# Patient Record
Sex: FEMALE | Race: WHITE | NOT HISPANIC OR LATINO | Employment: OTHER
[De-identification: names, ages, dates, MRNs, and addresses within clinical notes are randomized per-mention and may not be internally consistent; named-entity substitution may affect disease eponyms.]

---

## 2018-03-21 ENCOUNTER — TRANSCRIBE ORDERS (OUTPATIENT)
Dept: SCHEDULING | Age: 68
End: 2018-03-21

## 2018-03-21 DIAGNOSIS — Z12.39 SCREENING BREAST EXAMINATION: Primary | ICD-10-CM

## 2018-03-26 ENCOUNTER — TRANSCRIBE ORDERS (OUTPATIENT)
Dept: SCHEDULING | Age: 68
End: 2018-03-26

## 2018-03-26 DIAGNOSIS — Z79.83 ENCOUNTER FOR ONGOING OSTEOPOROSIS BISPHOSPHONATE THERAPY: Primary | ICD-10-CM

## 2018-03-26 DIAGNOSIS — M81.0 ENCOUNTER FOR ONGOING OSTEOPOROSIS BISPHOSPHONATE THERAPY: Primary | ICD-10-CM

## 2018-03-26 DIAGNOSIS — M81.0 SENILE OSTEOPOROSIS: Primary | ICD-10-CM

## 2018-04-13 ENCOUNTER — HOSPITAL ENCOUNTER (OUTPATIENT)
Dept: RADIOLOGY | Facility: HOSPITAL | Age: 68
Discharge: HOME | End: 2018-04-13
Attending: INTERNAL MEDICINE
Payer: MEDICARE

## 2018-04-13 ENCOUNTER — HOSPITAL ENCOUNTER (OUTPATIENT)
Dept: RADIOLOGY | Facility: HOSPITAL | Age: 68
Discharge: HOME | End: 2018-04-13
Attending: OBSTETRICS & GYNECOLOGY
Payer: MEDICARE

## 2018-04-13 DIAGNOSIS — Z12.39 SCREENING BREAST EXAMINATION: ICD-10-CM

## 2018-04-13 DIAGNOSIS — M81.0 SENILE OSTEOPOROSIS: ICD-10-CM

## 2018-04-13 PROCEDURE — 77063 BREAST TOMOSYNTHESIS BI: CPT

## 2018-04-13 PROCEDURE — 77080 DXA BONE DENSITY AXIAL: CPT

## 2018-05-02 ENCOUNTER — TRANSCRIBE ORDERS (OUTPATIENT)
Dept: SCHEDULING | Age: 68
End: 2018-05-02

## 2018-05-03 ENCOUNTER — TRANSCRIBE ORDERS (OUTPATIENT)
Dept: SCHEDULING | Age: 68
End: 2018-05-03

## 2018-05-03 DIAGNOSIS — I73.9 INTERMITTENT CLAUDICATION (CMS/HCC): Primary | ICD-10-CM

## 2018-06-01 RX ORDER — TOPIRAMATE 200 MG/1
200 TABLET ORAL DAILY
COMMUNITY
Start: 2016-01-18 | End: 2018-06-24

## 2018-06-01 RX ORDER — LOSARTAN POTASSIUM 50 MG/1
50 TABLET ORAL DAILY
COMMUNITY
Start: 2017-11-28 | End: 2018-10-09

## 2018-06-02 PROBLEM — I10 ESSENTIAL HYPERTENSION: Status: ACTIVE | Noted: 2018-06-02

## 2018-06-02 PROBLEM — E78.5 DYSLIPIDEMIA: Status: ACTIVE | Noted: 2018-06-02

## 2018-06-02 PROBLEM — R00.2 PALPITATIONS: Status: ACTIVE | Noted: 2018-06-02

## 2018-06-13 ENCOUNTER — TELEPHONE (OUTPATIENT)
Dept: SCHEDULING | Facility: CLINIC | Age: 68
End: 2018-06-13

## 2018-06-13 NOTE — TELEPHONE ENCOUNTER
Kadie [facility] called asking for last ofc note,ekg, stress and ercho as well as cardiac cath report faxed to [phone]. Any questions call Kadie

## 2018-10-09 ENCOUNTER — OFFICE VISIT (OUTPATIENT)
Dept: CARDIOLOGY | Facility: CLINIC | Age: 68
End: 2018-10-09
Payer: MEDICARE

## 2018-10-09 VITALS
BODY MASS INDEX: 15.99 KG/M2 | HEART RATE: 62 BPM | SYSTOLIC BLOOD PRESSURE: 110 MMHG | RESPIRATION RATE: 16 BRPM | DIASTOLIC BLOOD PRESSURE: 80 MMHG | WEIGHT: 99.5 LBS | HEIGHT: 66 IN

## 2018-10-09 DIAGNOSIS — E78.5 DYSLIPIDEMIA: ICD-10-CM

## 2018-10-09 DIAGNOSIS — R00.2 PALPITATIONS: ICD-10-CM

## 2018-10-09 DIAGNOSIS — I10 ESSENTIAL HYPERTENSION: Primary | ICD-10-CM

## 2018-10-09 PROCEDURE — 99214 OFFICE O/P EST MOD 30 MIN: CPT | Performed by: INTERNAL MEDICINE

## 2018-10-09 PROCEDURE — 93000 ELECTROCARDIOGRAM COMPLETE: CPT | Performed by: INTERNAL MEDICINE

## 2018-10-09 RX ORDER — DICYCLOMINE HYDROCHLORIDE 10 MG/1
10 CAPSULE ORAL AS NEEDED
COMMUNITY
End: 2019-10-08

## 2018-10-09 RX ORDER — TOPIRAMATE 100 MG/1
100 CAPSULE, EXTENDED RELEASE ORAL NIGHTLY
COMMUNITY
Start: 2018-10-08

## 2018-10-09 RX ORDER — ALBUTEROL SULFATE 90 UG/1
2 AEROSOL, METERED RESPIRATORY (INHALATION) AS NEEDED
Refills: 0 | COMMUNITY
Start: 2018-07-10 | End: 2018-10-09

## 2018-10-09 RX ORDER — DIAZEPAM 5 MG/1
5 TABLET ORAL
Refills: 2 | COMMUNITY
Start: 2018-08-23 | End: 2019-10-08

## 2018-10-09 RX ORDER — ALBUTEROL SULFATE 0.83 MG/ML
1 SOLUTION RESPIRATORY (INHALATION) AS NEEDED
Refills: 5 | COMMUNITY
Start: 2018-07-28 | End: 2018-10-09

## 2018-10-09 RX ORDER — ERGOCALCIFEROL 1.25 MG/1
1 CAPSULE ORAL
COMMUNITY
Start: 2017-02-23 | End: 2024-04-17 | Stop reason: ENTERED-IN-ERROR

## 2018-10-09 RX ORDER — LOSARTAN POTASSIUM 50 MG/1
50 TABLET ORAL DAILY
Qty: 90 TABLET | Refills: 3 | Status: SHIPPED | OUTPATIENT
Start: 2018-10-09 | End: 2019-10-08

## 2018-10-09 RX ORDER — OMEPRAZOLE 20 MG/1
1 CAPSULE, DELAYED RELEASE ORAL AS NEEDED
Refills: 11 | COMMUNITY
Start: 2018-08-13 | End: 2019-10-08

## 2018-10-09 ASSESSMENT — ENCOUNTER SYMPTOMS
COUGH: 0
LIGHT-HEADEDNESS: 0
DYSPNEA ON EXERTION: 0
MUSCLE CRAMPS: 0
WEIGHT LOSS: 0
SHORTNESS OF BREATH: 0
ALTERED MENTAL STATUS: 0
NECK PAIN: 1
ADENOPATHY: 0
DIAPHORESIS: 0
SYNCOPE: 0
ORTHOPNEA: 0
HEARTBURN: 0
COLOR CHANGE: 0
WEIGHT GAIN: 0
PALPITATIONS: 0
BLURRED VISION: 0
CLAUDICATION: 0

## 2018-10-09 NOTE — PROGRESS NOTES
Cardiology Note       Reason for visit:   Chief Complaint   Patient presents with   • Hypertension      HPI   Corrina Conteh is a 67 y.o. female who presents for scheduled cardiovascular follow-up in the office.    I last saw her a year ago.  I treat her for hypertension.  She updated me on her health history.  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  She believes she is cancer free and did not require radiation or chemotherapy.  She tells me she had pneumonia over the summer which slowed her down.  She now however feels well.  She is temporarily working.  She exercises by using the [employer] and doing yoga.  She is now eating a vegan diet.  She denies cardiac symptoms such as chest pain, shortness of breath, palpitations, lightheadedness or syncope.  She believes her blood pressure is under nice control and she tolerates losartan.      History reviewed. No pertinent past medical history.  History reviewed. No pertinent surgical history.  Social History     Social History   • Marital status:      Spouse name: N/A   • Number of children: N/A   • Years of education: N/A     Social History Main Topics   • Smoking status: Never Smoker   • Smokeless tobacco: Never Used   • Alcohol use No   • Drug use: No   • Sexual activity: Not Asked     Other Topics Concern   • None     Social History Narrative   • None     History reviewed. No pertinent family history.  Hydromorphone; Nsaids (non-steroidal anti-inflammatory drug); Penicillins; and Sulfa (sulfonamide antibiotics)  Current Outpatient Prescriptions   Medication Sig Dispense Refill   • diazePAM (VALIUM) 5 mg tablet Take 5 mg by mouth 2 (two) times a day.  2   • dicyclomine (BENTYL) 10 mg capsule Take 10 mg by mouth as needed.     • ergocalciferol (VITAMIN D2) 50,000 unit capsule Take 1 capsule by mouth every 30 (thirty) days.       • losartan (COZAAR) 50 mg tablet Take 1 tablet (50 mg total) by mouth daily. 90 tablet 3   • omeprazole  (PriLOSEC) 20 mg capsule Take 1 capsule by mouth as needed.    11   • ranitidine (ZANTAC) 300 mg tablet Take 300 mg by mouth as needed.       • topiramate (TOPAMAX) 200 mg tablet Take 1 tablet by mouth daily.       No current facility-administered medications for this visit.        Review of Systems   Constitution: Negative for diaphoresis, malaise/fatigue, weight gain and weight loss.   HENT: Negative for nosebleeds.    Eyes: Negative for blurred vision.   Cardiovascular: Negative for chest pain, claudication, dyspnea on exertion, leg swelling, orthopnea, palpitations and syncope.   Respiratory: Negative for cough and shortness of breath.    Hematologic/Lymphatic: Negative for adenopathy.   Skin: Negative for color change.   Musculoskeletal: Positive for neck pain. Negative for muscle cramps.   Gastrointestinal: Negative for heartburn.   Genitourinary: Negative for nocturia.   Neurological: Negative for light-headedness.   Psychiatric/Behavioral: Negative for altered mental status.     Objective   Vitals:    10/09/18 0832   BP: 110/80   Pulse: 62   Resp: 16       Physical Exam:    General Appearance:  Alert, no distress   Head:  Normocephalic, without obvious abnormality, atraumatic   Eyes:  Conjunctiva/corneas clear, EOM's intact   Neck: No thyroid enlargement. No JVD. No bruits    Lungs:   Clear to auscultation bilaterally, respirations unlabored, no rales, no wheezing   Heart:  Regular rhythm, S1 and S2 normal, no murmur, rub or gallop   Abdomen:   Soft, non-tender, no masses, no organomegaly   Vascular: Pulses 2+ and symmetric all extremities, no carotid bruit or jugular vein distention   Musculoskeletal:  Skin: No injury or deformity  Skin color, texture, turgor normal, no rashes or lesions, no cyanosis or edema   Extremities: Extremities normal, atraumatic, pulses normal   Behavior/Emotional: Appropriate, cooperative       Lab Results   Component Value Date    WBC 4.87 11/30/2017    HGB 12.4 11/30/2017    PLT  168 11/30/2017    CHOL 201 (H) 11/30/2017    TRIG 115 11/30/2017    HDL 61 11/30/2017    ALT 33 11/30/2017    AST 35 11/30/2017     11/30/2017    K 4.0 11/30/2017    CREATININE 0.7 11/30/2017    TSH 1.23 07/26/2016          ECG   sinus rhythm  Within normal limits     ASSESSMENT/PLAN    Problem List Items Addressed This Visit        High    Essential hypertension - Primary    Current Assessment & Plan     Her blood pressure is excellent on losartan.  She will continue to eat a heart healthy diet that is low in sodium.  She will continued exercise.         Relevant Medications    losartan (COZAAR) 50 mg tablet    Other Relevant Orders    ECG 12 lead (Completed)       Medium    Dyslipidemia    Overview     2009 cath: neg CAD  Calcium score: zero         Current Assessment & Plan     Her lipid profile from November 2017 was reviewed.  Her total cholesterol was 201, triglycerides 115, HDL 61.  She will continue on a heart healthy diet.  I do not believe she requires lipid altering therapy for primary prevention.            Low    Palpitations    Current Assessment & Plan     She has had no recent palpitations of concern.                Return in about 1 year (around 10/9/2019).    Sukh Sandra MD  10/9/2018

## 2018-10-09 NOTE — ASSESSMENT & PLAN NOTE
Her blood pressure is excellent on losartan.  She will continue to eat a heart healthy diet that is low in sodium.  She will continued exercise.

## 2018-10-09 NOTE — LETTER
October 9, 2018     Venus Aparicio MD  [address]  [address]  [place] [place] [facility] [address]    Patient: Corrina Conteh   YOB: 1950   Date of Visit: 10/9/2018       Dear Dr. Aparicio:    Thank you for referring Corrina Conteh to me for evaluation. Below are my notes for this consultation.    If you have questions, please do not hesitate to call me. I look forward to following your patient along with you.         Sincerely,        Sukh Sandra MD        CC: No Recipients  Sukh Sandra MD  10/9/2018  8:58 AM  Sign at close encounter     Cardiology Note       Reason for visit:   Chief Complaint   Patient presents with   • Hypertension      HPI   Corrina Conteh is a 67 y.o. female who presents for scheduled cardiovascular follow-up in the office.    I last saw her a year ago.  I treat her for hypertension.  She updated me on her health history.  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  She believes she is cancer free and did not require radiation or chemotherapy.  She tells me she had pneumonia over the summer which slowed her down.  She now however feels well.  She is temporarily working.  She exercises by using the [employer] and doing yoga.  She is now eating a vegan diet.  She denies cardiac symptoms such as chest pain, shortness of breath, palpitations, lightheadedness or syncope.  She believes her blood pressure is under nice control and she tolerates losartan.      History reviewed. No pertinent past medical history.  History reviewed. No pertinent surgical history.  Social History     Social History   • Marital status:      Spouse name: N/A   • Number of children: N/A   • Years of education: N/A     Social History Main Topics   • Smoking status: Never Smoker   • Smokeless tobacco: Never Used   • Alcohol use No   • Drug use: No   • Sexual activity: Not Asked     Other Topics Concern   • None     Social History Narrative   • None     History reviewed. No  pertinent family history.  Hydromorphone; Nsaids (non-steroidal anti-inflammatory drug); Penicillins; and Sulfa (sulfonamide antibiotics)  Current Outpatient Prescriptions   Medication Sig Dispense Refill   • diazePAM (VALIUM) 5 mg tablet Take 5 mg by mouth 2 (two) times a day.  2   • dicyclomine (BENTYL) 10 mg capsule Take 10 mg by mouth as needed.     • ergocalciferol (VITAMIN D2) 50,000 unit capsule Take 1 capsule by mouth every 30 (thirty) days.       • losartan (COZAAR) 50 mg tablet Take 1 tablet (50 mg total) by mouth daily. 90 tablet 3   • omeprazole (PriLOSEC) 20 mg capsule Take 1 capsule by mouth as needed.    11   • ranitidine (ZANTAC) 300 mg tablet Take 300 mg by mouth as needed.       • topiramate (TOPAMAX) 200 mg tablet Take 1 tablet by mouth daily.       No current facility-administered medications for this visit.        Review of Systems   Constitution: Negative for diaphoresis, malaise/fatigue, weight gain and weight loss.   HENT: Negative for nosebleeds.    Eyes: Negative for blurred vision.   Cardiovascular: Negative for chest pain, claudication, dyspnea on exertion, leg swelling, orthopnea, palpitations and syncope.   Respiratory: Negative for cough and shortness of breath.    Hematologic/Lymphatic: Negative for adenopathy.   Skin: Negative for color change.   Musculoskeletal: Positive for neck pain. Negative for muscle cramps.   Gastrointestinal: Negative for heartburn.   Genitourinary: Negative for nocturia.   Neurological: Negative for light-headedness.   Psychiatric/Behavioral: Negative for altered mental status.     Objective   Vitals:    10/09/18 0832   BP: 110/80   Pulse: 62   Resp: 16       Physical Exam:    General Appearance:  Alert, no distress   Head:  Normocephalic, without obvious abnormality, atraumatic   Eyes:  Conjunctiva/corneas clear, EOM's intact   Neck: No thyroid enlargement. No JVD. No bruits    Lungs:   Clear to auscultation bilaterally, respirations unlabored, no rales, no  wheezing   Heart:  Regular rhythm, S1 and S2 normal, no murmur, rub or gallop   Abdomen:   Soft, non-tender, no masses, no organomegaly   Vascular: Pulses 2+ and symmetric all extremities, no carotid bruit or jugular vein distention   Musculoskeletal:  Skin: No injury or deformity  Skin color, texture, turgor normal, no rashes or lesions, no cyanosis or edema   Extremities: Extremities normal, atraumatic, pulses normal   Behavior/Emotional: Appropriate, cooperative       Lab Results   Component Value Date    WBC 4.87 11/30/2017    HGB 12.4 11/30/2017     11/30/2017    CHOL 201 (H) 11/30/2017    TRIG 115 11/30/2017    HDL 61 11/30/2017    ALT 33 11/30/2017    AST 35 11/30/2017     11/30/2017    K 4.0 11/30/2017    CREATININE 0.7 11/30/2017    TSH 1.23 07/26/2016          ECG   sinus rhythm  Within normal limits     ASSESSMENT/PLAN    Problem List Items Addressed This Visit        High    Essential hypertension - Primary    Current Assessment & Plan     Her blood pressure is excellent on losartan.  She will continue to eat a heart healthy diet that is low in sodium.  She will continued exercise.         Relevant Medications    losartan (COZAAR) 50 mg tablet    Other Relevant Orders    ECG 12 lead (Completed)       Medium    Dyslipidemia    Overview     2009 cath: neg CAD  Calcium score: zero         Current Assessment & Plan     Her lipid profile from November 2017 was reviewed.  Her total cholesterol was 201, triglycerides 115, HDL 61.  She will continue on a heart healthy diet.  I do not believe she requires lipid altering therapy for primary prevention.            Low    Palpitations    Current Assessment & Plan     She has had no recent palpitations of concern.                Return in about 1 year (around 10/9/2019).    Sukh Sandra MD  10/9/2018

## 2018-10-09 NOTE — ASSESSMENT & PLAN NOTE
Her lipid profile from November 2017 was reviewed.  Her total cholesterol was 201, triglycerides 115, HDL 61.  She will continue on a heart healthy diet.  I do not believe she requires lipid altering therapy for primary prevention.

## 2019-04-03 ENCOUNTER — TRANSCRIBE ORDERS (OUTPATIENT)
Dept: SCHEDULING | Age: 69
End: 2019-04-03

## 2019-04-03 DIAGNOSIS — Z12.31 ENCOUNTER FOR SCREENING MAMMOGRAM FOR MALIGNANT NEOPLASM OF BREAST: ICD-10-CM

## 2019-04-03 DIAGNOSIS — M54.50 LOW BACK PAIN: Primary | ICD-10-CM

## 2019-04-04 ENCOUNTER — HOSPITAL ENCOUNTER (OUTPATIENT)
Dept: RADIOLOGY | Facility: HOSPITAL | Age: 69
Discharge: HOME | End: 2019-04-04
Attending: FAMILY MEDICINE
Payer: MEDICARE

## 2019-04-04 DIAGNOSIS — M54.50 LOW BACK PAIN: ICD-10-CM

## 2019-04-04 PROCEDURE — 72110 X-RAY EXAM L-2 SPINE 4/>VWS: CPT

## 2019-04-26 ENCOUNTER — HOSPITAL ENCOUNTER (OUTPATIENT)
Dept: RADIOLOGY | Facility: HOSPITAL | Age: 69
Discharge: HOME | End: 2019-04-26
Attending: FAMILY MEDICINE
Payer: MEDICARE

## 2019-04-26 DIAGNOSIS — Z12.31 ENCOUNTER FOR SCREENING MAMMOGRAM FOR MALIGNANT NEOPLASM OF BREAST: ICD-10-CM

## 2019-04-26 PROCEDURE — 77063 BREAST TOMOSYNTHESIS BI: CPT

## 2019-05-07 ENCOUNTER — APPOINTMENT (OUTPATIENT)
Dept: LAB | Facility: HOSPITAL | Age: 69
End: 2019-05-07
Attending: INTERNAL MEDICINE
Payer: MEDICARE

## 2019-05-07 ENCOUNTER — TRANSCRIBE ORDERS (OUTPATIENT)
Dept: LAB | Facility: HOSPITAL | Age: 69
End: 2019-05-07

## 2019-05-07 DIAGNOSIS — B02.9 ZOSTER WITHOUT COMPLICATIONS: ICD-10-CM

## 2019-05-07 DIAGNOSIS — C00.9: ICD-10-CM

## 2019-05-07 DIAGNOSIS — M54.50 LOW BACK PAIN: ICD-10-CM

## 2019-05-07 DIAGNOSIS — C00.9: Primary | ICD-10-CM

## 2019-05-07 DIAGNOSIS — E55.9 VITAMIN D DEFICIENCY: ICD-10-CM

## 2019-05-07 DIAGNOSIS — M25.512 PAIN IN LEFT SHOULDER: ICD-10-CM

## 2019-05-07 DIAGNOSIS — M81.0 AGE-RELATED OSTEOPOROSIS WITHOUT CURRENT PATHOLOGICAL FRACTURE: ICD-10-CM

## 2019-05-07 DIAGNOSIS — Z79.83 LONG TERM USE OF BISPHOSPHONATES: ICD-10-CM

## 2019-05-07 LAB
25(OH)D3 SERPL-MCNC: 30 NG/ML (ref 30–100)
ALBUMIN SERPL-MCNC: 4.1 G/DL (ref 3.4–5)
ALP SERPL-CCNC: 47 IU/L (ref 35–126)
ALT SERPL-CCNC: 35 IU/L (ref 11–54)
ANION GAP SERPL CALC-SCNC: 10 MEQ/L (ref 3–15)
AST SERPL-CCNC: 41 IU/L (ref 15–41)
BASOPHILS # BLD: 0.02 K/UL (ref 0.01–0.1)
BASOPHILS NFR BLD: 0.4 %
BILIRUB SERPL-MCNC: 0.5 MG/DL (ref 0.3–1.2)
BUN SERPL-MCNC: 23 MG/DL (ref 8–20)
CALCIUM SERPL-MCNC: 9.2 MG/DL (ref 8.9–10.3)
CHLORIDE SERPL-SCNC: 109 MEQ/L (ref 98–109)
CO2 SERPL-SCNC: 20 MEQ/L (ref 22–32)
CREAT SERPL-MCNC: 0.9 MG/DL
DIFFERENTIAL METHOD BLD: NORMAL
EOSINOPHIL # BLD: 0.09 K/UL (ref 0.04–0.36)
EOSINOPHIL NFR BLD: 1.8 %
ERYTHROCYTE [DISTWIDTH] IN BLOOD BY AUTOMATED COUNT: 13 % (ref 11.7–14.4)
GFR SERPL CREATININE-BSD FRML MDRD: >60 ML/MIN/1.73M*2
GLUCOSE SERPL-MCNC: 87 MG/DL (ref 70–99)
HCT VFR BLDCO AUTO: 41.5 %
HGB BLD-MCNC: 13.3 G/DL
IMM GRANULOCYTES # BLD AUTO: 0 K/UL (ref 0–0.08)
IMM GRANULOCYTES NFR BLD AUTO: 0 %
LYMPHOCYTES # BLD: 1.81 K/UL (ref 1.2–3.5)
LYMPHOCYTES NFR BLD: 36.9 %
MAGNESIUM SERPL-MCNC: 2.3 MG/DL (ref 1.8–2.5)
MCH RBC QN AUTO: 30 PG (ref 28–33.2)
MCHC RBC AUTO-ENTMCNC: 32 G/DL (ref 32.2–35.5)
MCV RBC AUTO: 93.7 FL (ref 83–98)
MONOCYTES # BLD: 0.55 K/UL (ref 0.28–0.8)
MONOCYTES NFR BLD: 11.2 %
NEUTROPHILS # BLD: 2.43 K/UL (ref 1.7–7)
NEUTS SEG NFR BLD: 49.7 %
NRBC BLD-RTO: 0 %
PDW BLD AUTO: 10.5 FL (ref 9.4–12.3)
PLATELET # BLD AUTO: 199 K/UL
POTASSIUM SERPL-SCNC: 4.2 MEQ/L (ref 3.6–5.1)
PROT SERPL-MCNC: 6.8 G/DL (ref 6–8.2)
RBC # BLD AUTO: 4.43 M/UL (ref 3.93–5.22)
SODIUM SERPL-SCNC: 139 MEQ/L (ref 136–144)
WBC # BLD AUTO: 4.9 K/UL

## 2019-05-07 PROCEDURE — 82306 VITAMIN D 25 HYDROXY: CPT

## 2019-05-07 PROCEDURE — 85025 COMPLETE CBC W/AUTO DIFF WBC: CPT

## 2019-05-07 PROCEDURE — 83735 ASSAY OF MAGNESIUM: CPT

## 2019-05-07 PROCEDURE — 80053 COMPREHEN METABOLIC PANEL: CPT

## 2019-05-07 PROCEDURE — 36415 COLL VENOUS BLD VENIPUNCTURE: CPT

## 2019-08-30 ENCOUNTER — TELEPHONE (OUTPATIENT)
Dept: CARDIOLOGY | Facility: CLINIC | Age: 69
End: 2019-08-30

## 2019-08-30 NOTE — TELEPHONE ENCOUNTER
Called pt and left vm for the pt to call back to her 09/16 appt rescheduled as TAS will be on vacation that week thanks

## 2019-10-08 ENCOUNTER — OFFICE VISIT (OUTPATIENT)
Dept: CARDIOLOGY | Facility: CLINIC | Age: 69
End: 2019-10-08
Payer: MEDICARE

## 2019-10-08 VITALS
DIASTOLIC BLOOD PRESSURE: 60 MMHG | BODY MASS INDEX: 16.23 KG/M2 | HEART RATE: 66 BPM | WEIGHT: 101 LBS | SYSTOLIC BLOOD PRESSURE: 120 MMHG | HEIGHT: 66 IN

## 2019-10-08 DIAGNOSIS — R00.2 PALPITATIONS: ICD-10-CM

## 2019-10-08 DIAGNOSIS — E78.5 DYSLIPIDEMIA: ICD-10-CM

## 2019-10-08 DIAGNOSIS — I10 ESSENTIAL HYPERTENSION: Primary | ICD-10-CM

## 2019-10-08 PROCEDURE — 93000 ELECTROCARDIOGRAM COMPLETE: CPT | Performed by: INTERNAL MEDICINE

## 2019-10-08 PROCEDURE — 99213 OFFICE O/P EST LOW 20 MIN: CPT | Performed by: INTERNAL MEDICINE

## 2019-10-08 RX ORDER — LOSARTAN POTASSIUM 50 MG/1
50 TABLET ORAL DAILY
Qty: 90 TABLET | Refills: 3 | Status: SHIPPED | OUTPATIENT
Start: 2019-10-08 | End: 2020-04-29 | Stop reason: CLARIF

## 2019-10-08 ASSESSMENT — ENCOUNTER SYMPTOMS
DIAPHORESIS: 0
COLOR CHANGE: 0
ALTERED MENTAL STATUS: 0
BLURRED VISION: 0
ADENOPATHY: 0
WEIGHT GAIN: 0
DYSPNEA ON EXERTION: 0
SYNCOPE: 0
ORTHOPNEA: 0
HEADACHES: 1
LIGHT-HEADEDNESS: 1
CLAUDICATION: 0
HEARTBURN: 0
WEIGHT LOSS: 0
PALPITATIONS: 0
SHORTNESS OF BREATH: 0
COUGH: 0
MUSCLE CRAMPS: 0

## 2019-10-08 NOTE — LETTER
October 8, 2019     Venus Aparicio MD  [address]  [address]  [place] [address]    Patient: Corrina Conteh  YOB: 1950  Date of Visit: 10/8/2019      Dear Dr. Aparicio:    Thank you for referring Corrina Conteh to me for evaluation. Below are my notes for this consultation.    If you have questions, please do not hesitate to call me. I look forward to following your patient along with you.         Sincerely,        Sukh Sandra MD        CC: No Recipients  Sukh Sandra MD  10/8/2019 12:27 PM  Sign at close encounter     Cardiology Note       Reason for visit:   Chief Complaint   Patient presents with   • Hypertension      HPI   Corrina Conteh is a 68 y.o. female who presents for cardiovascular follow-up in the office.    I last saw her one year ago.  She has not had any changes to her health over that time.  She is still treated for headaches.  She has occasional heartburn.  She has occasional lightheadedness but no presyncope or syncope.  She exercises by doing yoga, dance, walking and using her palatine.  She denies chest pain or dyspnea of concern.  Her weight has been stable.  She eats a heart healthy diet.      History reviewed. No pertinent past medical history.  History reviewed. No pertinent surgical history.  Social History     Social History   • Marital status:      Spouse name: N/A   • Number of children: N/A   • Years of education: N/A     Social History Main Topics   • Smoking status: Never Smoker   • Smokeless tobacco: Never Used   • Alcohol use No   • Drug use: No   • Sexual activity: Defer     Other Topics Concern   • None     Social History Narrative   • None     History reviewed. No pertinent family history.  Hydromorphone; Nsaids (non-steroidal anti-inflammatory drug); Penicillins; and Sulfa (sulfonamide antibiotics)  Current Outpatient Prescriptions   Medication Sig Dispense Refill   • ergocalciferol (VITAMIN D2) 50,000 unit capsule Take 1 capsule by mouth  "every 30 (thirty) days.       • losartan (COZAAR) 50 mg tablet Take 1 tablet (50 mg total) by mouth daily. 90 tablet 3   • topiramate 100 mg capsule,extended release 24hr Take 100 mg by mouth daily.         No current facility-administered medications for this visit.        Review of Systems   Constitution: Negative for diaphoresis, malaise/fatigue, weight gain and weight loss.   HENT: Negative for nosebleeds.    Eyes: Negative for blurred vision.   Cardiovascular: Negative for chest pain, claudication, dyspnea on exertion, leg swelling, orthopnea, palpitations and syncope.   Respiratory: Negative for cough and shortness of breath.    Hematologic/Lymphatic: Negative for adenopathy.   Skin: Negative for color change.   Musculoskeletal: Negative for muscle cramps.   Gastrointestinal: Negative for heartburn.   Genitourinary: Negative for nocturia.   Neurological: Positive for headaches and light-headedness.   Psychiatric/Behavioral: Negative for altered mental status.     Objective   Vitals:    10/08/19 0959   BP: 120/60   BP Location: Left upper arm   Patient Position: Sitting   Pulse: 66   Weight: 45.8 kg (101 lb)   Height: 1.676 m (5' 6\")     Weight: 45.8 kg (101 lb)     Physical Exam:    General Appearance:  Alert, no distress   Head:  Normocephalic, without obvious abnormality, atraumatic   Eyes:  Conjunctiva/corneas clear, EOM's intact   Neck: No thyroid enlargement. No JVD. No bruits    Lungs:   Clear to auscultation bilaterally, respirations unlabored, no rales, no wheezing   Heart:  Regular rhythm, S1 and S2 normal, no murmur, rub or gallop   Abdomen:   Soft, non-tender, no masses, no organomegaly   Vascular: Pulses 2+ and symmetric all extremities, no carotid bruit or jugular vein distention   Musculoskeletal:  Skin: No injury or deformity  Skin color, texture, turgor normal, no rashes or lesions, no cyanosis or edema   Extremities: Extremities normal, atraumatic, pulses normal   Behavior/Emotional: " Appropriate, cooperative       Lab Results   Component Value Date    WBC 4.90 05/07/2019    HGB 13.3 05/07/2019     05/07/2019    CHOL 201 (H) 11/30/2017    TRIG 115 11/30/2017    HDL 61 11/30/2017    LDLCALC 117 (H) 11/30/2017    ALT 35 05/07/2019    AST 41 05/07/2019     05/07/2019    K 4.2 05/07/2019    CREATININE 0.9 05/07/2019    TSH 1.23 07/26/2016          ECG   sinus rhythm  Within normal limits     ASSESSMENT/PLAN    Problem List Items Addressed This Visit        High    Essential hypertension - Primary    Current Assessment & Plan     Her blood pressure is borderline low on losartan.  I told her if she has episodes of lightheadedness to contact me as I would lower her dose to 25 mg daily.  She will try to stay well-hydrated.  For now she will continue on her current dose.         Relevant Medications    losartan (COZAAR) 50 mg tablet    Other Relevant Orders    ECG 12 LEAD-OFFICE PERFORMED (Completed)       Medium    Dyslipidemia    Overview     2009 cath: neg CAD  Calcium score: zero         Current Assessment & Plan     She continues to try to eat a heart healthy diet.  She maintains a healthy weight.  She has no symptoms at this time to suggest any angina.  She will continue exercise report symptoms of concern.         Relevant Orders    ECG 12 LEAD-OFFICE PERFORMED (Completed)       Low    Palpitations    Current Assessment & Plan     She has not had palpitations lately.  No further therapy evaluation is required at this time.         Relevant Orders    ECG 12 LEAD-OFFICE PERFORMED (Completed)           Return in about 1 year (around 10/8/2020).    Sukh Sandra MD  10/8/2019

## 2019-10-08 NOTE — ASSESSMENT & PLAN NOTE
Her blood pressure is borderline low on losartan.  I told her if she has episodes of lightheadedness to contact me as I would lower her dose to 25 mg daily.  She will try to stay well-hydrated.  For now she will continue on her current dose.

## 2019-10-08 NOTE — PROGRESS NOTES
Cardiology Note       Reason for visit:   Chief Complaint   Patient presents with   • Hypertension      HPI   Corrina Conteh is a 68 y.o. female who presents for cardiovascular follow-up in the office.    I last saw her one year ago.  She has not had any changes to her health over that time.  She is still treated for headaches.  She has occasional heartburn.  She has occasional lightheadedness but no presyncope or syncope.  She exercises by doing yoga, dance, walking and using her palatine.  She denies chest pain or dyspnea of concern.  Her weight has been stable.  She eats a heart healthy diet.      History reviewed. No pertinent past medical history.  History reviewed. No pertinent surgical history.  Social History     Social History   • Marital status:      Spouse name: N/A   • Number of children: N/A   • Years of education: N/A     Social History Main Topics   • Smoking status: Never Smoker   • Smokeless tobacco: Never Used   • Alcohol use No   • Drug use: No   • Sexual activity: Defer     Other Topics Concern   • None     Social History Narrative   • None     History reviewed. No pertinent family history.  Hydromorphone; Nsaids (non-steroidal anti-inflammatory drug); Penicillins; and Sulfa (sulfonamide antibiotics)  Current Outpatient Prescriptions   Medication Sig Dispense Refill   • ergocalciferol (VITAMIN D2) 50,000 unit capsule Take 1 capsule by mouth every 30 (thirty) days.       • losartan (COZAAR) 50 mg tablet Take 1 tablet (50 mg total) by mouth daily. 90 tablet 3   • topiramate 100 mg capsule,extended release 24hr Take 100 mg by mouth daily.         No current facility-administered medications for this visit.        Review of Systems   Constitution: Negative for diaphoresis, malaise/fatigue, weight gain and weight loss.   HENT: Negative for nosebleeds.    Eyes: Negative for blurred vision.   Cardiovascular: Negative for chest pain, claudication, dyspnea on exertion, leg swelling, orthopnea,  "palpitations and syncope.   Respiratory: Negative for cough and shortness of breath.    Hematologic/Lymphatic: Negative for adenopathy.   Skin: Negative for color change.   Musculoskeletal: Negative for muscle cramps.   Gastrointestinal: Negative for heartburn.   Genitourinary: Negative for nocturia.   Neurological: Positive for headaches and light-headedness.   Psychiatric/Behavioral: Negative for altered mental status.     Objective   Vitals:    10/08/19 0959   BP: 120/60   BP Location: Left upper arm   Patient Position: Sitting   Pulse: 66   Weight: 45.8 kg (101 lb)   Height: 1.676 m (5' 6\")     Weight: 45.8 kg (101 lb)     Physical Exam:    General Appearance:  Alert, no distress   Head:  Normocephalic, without obvious abnormality, atraumatic   Eyes:  Conjunctiva/corneas clear, EOM's intact   Neck: No thyroid enlargement. No JVD. No bruits    Lungs:   Clear to auscultation bilaterally, respirations unlabored, no rales, no wheezing   Heart:  Regular rhythm, S1 and S2 normal, no murmur, rub or gallop   Abdomen:   Soft, non-tender, no masses, no organomegaly   Vascular: Pulses 2+ and symmetric all extremities, no carotid bruit or jugular vein distention   Musculoskeletal:  Skin: No injury or deformity  Skin color, texture, turgor normal, no rashes or lesions, no cyanosis or edema   Extremities: Extremities normal, atraumatic, pulses normal   Behavior/Emotional: Appropriate, cooperative       Lab Results   Component Value Date    WBC 4.90 05/07/2019    HGB 13.3 05/07/2019     05/07/2019    CHOL 201 (H) 11/30/2017    TRIG 115 11/30/2017    HDL 61 11/30/2017    LDLCALC 117 (H) 11/30/2017    ALT 35 05/07/2019    AST 41 05/07/2019     05/07/2019    K 4.2 05/07/2019    CREATININE 0.9 05/07/2019    TSH 1.23 07/26/2016          ECG   sinus rhythm  Within normal limits     ASSESSMENT/PLAN    Problem List Items Addressed This Visit        High    Essential hypertension - Primary    Current Assessment & Plan     " Her blood pressure is borderline low on losartan.  I told her if she has episodes of lightheadedness to contact me as I would lower her dose to 25 mg daily.  She will try to stay well-hydrated.  For now she will continue on her current dose.         Relevant Medications    losartan (COZAAR) 50 mg tablet    Other Relevant Orders    ECG 12 LEAD-OFFICE PERFORMED (Completed)       Medium    Dyslipidemia    Overview     2009 cath: neg CAD  Calcium score: zero         Current Assessment & Plan     She continues to try to eat a heart healthy diet.  She maintains a healthy weight.  She has no symptoms at this time to suggest any angina.  She will continue exercise report symptoms of concern.         Relevant Orders    ECG 12 LEAD-OFFICE PERFORMED (Completed)       Low    Palpitations    Current Assessment & Plan     She has not had palpitations lately.  No further therapy evaluation is required at this time.         Relevant Orders    ECG 12 LEAD-OFFICE PERFORMED (Completed)           Return in about 1 year (around 10/8/2020).    Sukh Sandra MD  10/8/2019

## 2019-10-08 NOTE — ASSESSMENT & PLAN NOTE
She continues to try to eat a heart healthy diet.  She maintains a healthy weight.  She has no symptoms at this time to suggest any angina.  She will continue exercise report symptoms of concern.

## 2019-11-18 ENCOUNTER — LAB REQUISITION (OUTPATIENT)
Dept: LAB | Facility: HOSPITAL | Age: 69
End: 2019-11-18
Payer: MEDICARE

## 2019-11-18 DIAGNOSIS — K58.0 IRRITABLE BOWEL SYNDROME WITH DIARRHEA: ICD-10-CM

## 2019-11-18 PROCEDURE — G0328 FECAL BLOOD SCRN IMMUNOASSAY: HCPCS | Performed by: INTERNAL MEDICINE

## 2019-11-19 LAB — HEMOCCULT STL QL IA: NEGATIVE

## 2019-12-27 ENCOUNTER — TRANSCRIBE ORDERS (OUTPATIENT)
Dept: SCHEDULING | Age: 69
End: 2019-12-27

## 2019-12-27 DIAGNOSIS — N95.0 POSTMENOPAUSAL BLEEDING: ICD-10-CM

## 2019-12-27 DIAGNOSIS — N92.6 IRREGULAR MENSTRUATION, UNSPECIFIED: Primary | ICD-10-CM

## 2020-01-02 ENCOUNTER — HOSPITAL ENCOUNTER (OUTPATIENT)
Dept: RADIOLOGY | Facility: HOSPITAL | Age: 70
Discharge: HOME | End: 2020-01-02
Attending: OBSTETRICS & GYNECOLOGY
Payer: MEDICARE

## 2020-01-02 DIAGNOSIS — N95.0 POSTMENOPAUSAL BLEEDING: ICD-10-CM

## 2020-01-02 DIAGNOSIS — N92.6 IRREGULAR MENSTRUATION, UNSPECIFIED: ICD-10-CM

## 2020-01-02 PROCEDURE — 76830 TRANSVAGINAL US NON-OB: CPT

## 2020-04-29 RX ORDER — TELMISARTAN 40 MG/1
40 TABLET ORAL DAILY
Qty: 30 TABLET | Refills: 5 | Status: SHIPPED | OUTPATIENT
Start: 2020-04-29 | End: 2020-10-07

## 2020-04-29 RX ORDER — LOSARTAN POTASSIUM 50 MG/1
50 TABLET ORAL DAILY
Qty: 90 TABLET | Refills: 3 | Status: CANCELLED | OUTPATIENT
Start: 2020-04-29

## 2020-06-03 ENCOUNTER — TRANSCRIBE ORDERS (OUTPATIENT)
Dept: SCHEDULING | Age: 70
End: 2020-06-03

## 2020-06-03 DIAGNOSIS — Z79.83 LONG TERM (CURRENT) USE OF BISPHOSPHONATES: ICD-10-CM

## 2020-06-03 DIAGNOSIS — M54.50 LOW BACK PAIN: ICD-10-CM

## 2020-06-03 DIAGNOSIS — M25.511 PAIN IN RIGHT SHOULDER: ICD-10-CM

## 2020-06-03 DIAGNOSIS — M81.0 AGE-RELATED OSTEOPOROSIS WITHOUT CURRENT PATHOLOGICAL FRACTURE: ICD-10-CM

## 2020-06-03 DIAGNOSIS — G47.62 SLEEP RELATED LEG CRAMPS: ICD-10-CM

## 2020-06-03 DIAGNOSIS — M25.512 PAIN IN LEFT SHOULDER: ICD-10-CM

## 2020-06-03 DIAGNOSIS — C00.9 MALIGNANT NEOPLASM OF LIP, UNSPECIFIED: ICD-10-CM

## 2020-06-03 DIAGNOSIS — M76.31 ILIOTIBIAL BAND SYNDROME, RIGHT LEG: ICD-10-CM

## 2020-06-03 DIAGNOSIS — E55.9 VITAMIN D DEFICIENCY, UNSPECIFIED: Primary | ICD-10-CM

## 2020-06-03 DIAGNOSIS — M79.671 PAIN IN RIGHT FOOT: ICD-10-CM

## 2020-06-03 DIAGNOSIS — Z12.31 ENCOUNTER FOR SCREENING MAMMOGRAM FOR MALIGNANT NEOPLASM OF BREAST: Primary | ICD-10-CM

## 2020-06-25 ENCOUNTER — TRANSCRIBE ORDERS (OUTPATIENT)
Dept: SCHEDULING | Age: 70
End: 2020-06-25

## 2020-06-25 ENCOUNTER — HOSPITAL ENCOUNTER (OUTPATIENT)
Dept: RADIOLOGY | Facility: HOSPITAL | Age: 70
Discharge: HOME | End: 2020-06-25
Attending: INTERNAL MEDICINE
Payer: MEDICARE

## 2020-06-25 DIAGNOSIS — Z79.83 LONG TERM (CURRENT) USE OF BISPHOSPHONATES: Primary | ICD-10-CM

## 2020-06-25 DIAGNOSIS — E55.9 VITAMIN D DEFICIENCY, UNSPECIFIED: ICD-10-CM

## 2020-06-25 DIAGNOSIS — Z79.83 LONG TERM (CURRENT) USE OF BISPHOSPHONATES: ICD-10-CM

## 2020-06-25 DIAGNOSIS — M62.838 OTHER MUSCLE SPASM: ICD-10-CM

## 2020-06-25 DIAGNOSIS — M25.512 PAIN IN LEFT SHOULDER: ICD-10-CM

## 2020-06-25 DIAGNOSIS — M81.0 AGE-RELATED OSTEOPOROSIS WITHOUT CURRENT PATHOLOGICAL FRACTURE: ICD-10-CM

## 2020-06-25 DIAGNOSIS — M70.61 TROCHANTERIC BURSITIS, RIGHT HIP: ICD-10-CM

## 2020-06-25 PROCEDURE — 77080 DXA BONE DENSITY AXIAL: CPT

## 2020-08-26 ENCOUNTER — HOSPITAL ENCOUNTER (OUTPATIENT)
Dept: RADIOLOGY | Facility: HOSPITAL | Age: 70
Discharge: HOME | End: 2020-08-26
Attending: OBSTETRICS & GYNECOLOGY
Payer: MEDICARE

## 2020-08-26 DIAGNOSIS — Z12.31 ENCOUNTER FOR SCREENING MAMMOGRAM FOR MALIGNANT NEOPLASM OF BREAST: ICD-10-CM

## 2020-08-26 PROCEDURE — 77063 BREAST TOMOSYNTHESIS BI: CPT

## 2020-10-13 ENCOUNTER — TELEPHONE (OUTPATIENT)
Dept: SCHEDULING | Facility: CLINIC | Age: 70
End: 2020-10-13

## 2020-10-13 NOTE — TELEPHONE ENCOUNTER
Pt called inquiring as to whether it's possible to move tomorrow's appt to sometime on Thursday. She has a rheumatology appt in the area on Thurs at 2pm and was trying to combine trips. Pt can be reached at [phone].

## 2020-10-14 NOTE — TELEPHONE ENCOUNTER
Pt called to Cancel her appt. with Dr. Sandra @ 4 pm today 10/14/2020.     Pt states she is not feeling up to it today.     Pt wanted to Re-schedule for Next month.   Pt is Re-scheduled for 11/17/2020 with Dr. Sandra/ Offered Sooner with Molly and pt would like the 11/17.     Pt can be reached @ [phone].     TY

## 2020-10-15 ENCOUNTER — HOSPITAL ENCOUNTER (OUTPATIENT)
Dept: RADIOLOGY | Facility: HOSPITAL | Age: 70
Discharge: HOME | End: 2020-10-15
Attending: INTERNAL MEDICINE
Payer: MEDICARE

## 2020-10-15 ENCOUNTER — TRANSCRIBE ORDERS (OUTPATIENT)
Dept: RADIOLOGY | Facility: HOSPITAL | Age: 70
End: 2020-10-15

## 2020-10-15 DIAGNOSIS — M54.2 CERVICALGIA: ICD-10-CM

## 2020-10-15 DIAGNOSIS — M54.2 CERVICALGIA: Primary | ICD-10-CM

## 2020-10-15 PROCEDURE — 72050 X-RAY EXAM NECK SPINE 4/5VWS: CPT

## 2020-11-17 ENCOUNTER — OFFICE VISIT (OUTPATIENT)
Dept: CARDIOLOGY | Facility: CLINIC | Age: 70
End: 2020-11-17
Payer: MEDICARE

## 2020-11-17 ENCOUNTER — TELEPHONE (OUTPATIENT)
Dept: SCHEDULING | Facility: CLINIC | Age: 70
End: 2020-11-17

## 2020-11-17 VITALS
HEART RATE: 67 BPM | DIASTOLIC BLOOD PRESSURE: 60 MMHG | SYSTOLIC BLOOD PRESSURE: 118 MMHG | BODY MASS INDEX: 16.44 KG/M2 | HEIGHT: 66 IN | WEIGHT: 102.3 LBS

## 2020-11-17 DIAGNOSIS — I10 ESSENTIAL HYPERTENSION: Primary | ICD-10-CM

## 2020-11-17 DIAGNOSIS — E78.5 DYSLIPIDEMIA: ICD-10-CM

## 2020-11-17 DIAGNOSIS — R00.2 PALPITATIONS: ICD-10-CM

## 2020-11-17 PROCEDURE — 99213 OFFICE O/P EST LOW 20 MIN: CPT | Performed by: INTERNAL MEDICINE

## 2020-11-17 PROCEDURE — 93000 ELECTROCARDIOGRAM COMPLETE: CPT | Performed by: INTERNAL MEDICINE

## 2020-11-17 ASSESSMENT — ENCOUNTER SYMPTOMS
COUGH: 0
NECK PAIN: 1
CLAUDICATION: 0
COLOR CHANGE: 0
HEADACHES: 1
SYNCOPE: 0
ADENOPATHY: 0
LIGHT-HEADEDNESS: 0
ALTERED MENTAL STATUS: 0
ORTHOPNEA: 0
PALPITATIONS: 0
ABDOMINAL PAIN: 1
SHORTNESS OF BREATH: 0
DIAPHORESIS: 0
WEIGHT GAIN: 0
WEIGHT LOSS: 0
HEARTBURN: 0
MUSCLE CRAMPS: 0
DYSPNEA ON EXERTION: 0
BLURRED VISION: 0

## 2020-11-17 NOTE — TELEPHONE ENCOUNTER
Pt of Dr Sandra    Pt is calling in for a special request    She is currently experiencing one of her typical migraines    She has MD appt with MD Baeza at 230 PM and with MD Sandra at 340 PM    She is inquiring if MD Sandra would be able to squeeze her in earlier, preferably prior to her 230 PM appt with MD Baeza due to her migraine.  She is fearful it will get worse    She denies her migraine is r/t to any cardiac issues currently    Please advise    Pt can be reached at [phone]

## 2020-11-17 NOTE — TELEPHONE ENCOUNTER
I spoke to Haydee DE LEON, KAYLYNN and the patient.  She will come to the office before her appt with Dr. Dean.

## 2020-11-17 NOTE — LETTER
2020     Venus Aparicio MD  [id number] [facility]  [address]  [facility] [address]    Patient: Corrina Conteh  YOB: 1950  Date of Visit: 2020      Dear Dr. Aparicio:    Thank you for referring Corrina Conteh to me for evaluation. Below are my notes for this consultation.    If you have questions, please do not hesitate to call me. I look forward to following your patient along with you.         Sincerely,        Sukh Sandra MD        CC: No Recipients  Sukh Sandra MD  2020  2:30 PM  Signed     Cardiology Note       Reason for visit:   Chief Complaint   Patient presents with   • Hypertension      HPI   Corrina Conteh is a 69 y.o. female who presents for scheduled cardiovascular follow-up in the office.    I last saw her 1 year ago.  Unfortunately her mother  of complications of COVID-19 in May.  The patient has been active and involved with her [employer] in an online fashion.  She still has her headaches which are without change.  She exercises by walking or using the palatine or ballet bar on a regular basis.  Her 16-year-old granddaughter stays with her during the day during her virtual schooling.  Her gastrointestinal issues have improved after treatment of probiotic.  She denies cardiac symptoms of chest pain, shortness breath, palpitations, lightheadedness or syncope.  Her weight is stable.  Her diet is excellent.  Her blood pressure has been under nice control on telmisartan.  Her lipid profile is followed by her primary care physician.      History reviewed. No pertinent past medical history.  History reviewed. No pertinent surgical history.  Social History     Socioeconomic History   • Marital status:      Spouse name: None   • Number of children: None   • Years of education: None   • Highest education level: None   Occupational History   • None   Social Needs   • Financial resource strain: None   • Food insecurity     Worry: None     Inability: None    • Transportation needs     Medical: None     Non-medical: None   Tobacco Use   • Smoking status: Never Smoker   • Smokeless tobacco: Never Used   Substance and Sexual Activity   • Alcohol use: No   • Drug use: No   • Sexual activity: Defer   Lifestyle   • Physical activity     Days per week: None     Minutes per session: None   • Stress: None   Relationships   • Social connections     Talks on phone: None     Gets together: None     Attends Hoahaoism service: None     Active member of club or organization: None     Attends meetings of clubs or organizations: None     Relationship status: None   • Intimate partner violence     Fear of current or ex partner: None     Emotionally abused: None     Physically abused: None     Forced sexual activity: None   Other Topics Concern   • None   Social History Narrative   • None     History reviewed. No pertinent family history.  Hydromorphone, Nsaids (non-steroidal anti-inflammatory drug), Penicillins, and Sulfa (sulfonamide antibiotics)  Current Outpatient Medications   Medication Sig Dispense Refill   • ergocalciferol (VITAMIN D2) 50,000 unit capsule Take 1 capsule by mouth every 30 (thirty) days.       • telmisartan (MICARDIS) 40 mg tablet TAKE 1 TABLET BY MOUTH EVERY DAY 30 tablet 5   • topiramate 100 mg capsule,extended release 24hr Take 100 mg by mouth daily.         No current facility-administered medications for this visit.        Review of Systems   Constitution: Negative for diaphoresis, malaise/fatigue, weight gain and weight loss.   HENT: Negative for nosebleeds.    Eyes: Negative for blurred vision.   Cardiovascular: Negative for chest pain, claudication, dyspnea on exertion, leg swelling, orthopnea, palpitations and syncope.   Respiratory: Negative for cough and shortness of breath.    Hematologic/Lymphatic: Negative for adenopathy.   Skin: Negative for color change.   Musculoskeletal: Positive for neck pain. Negative for muscle cramps.   Gastrointestinal:  "Positive for abdominal pain. Negative for heartburn.   Genitourinary: Negative for nocturia.   Neurological: Positive for headaches. Negative for light-headedness.   Psychiatric/Behavioral: Negative for altered mental status.     Objective   Vitals:    11/17/20 1403   BP: 118/60   BP Location: Left upper arm   Patient Position: Sitting   Pulse: 67   Weight: 46.4 kg (102 lb 4.8 oz)   Height: 1.676 m (5' 6\")     Weight: 46.4 kg (102 lb 4.8 oz)     Physical Exam:    General Appearance:  Alert, no distress   Head:  Normocephalic, without obvious abnormality, atraumatic   Eyes:  Conjunctiva/corneas clear, EOM's intact   Neck: No thyroid enlargement. No JVD. No bruits    Lungs:   Clear to auscultation bilaterally, respirations unlabored, no rales, no wheezing   Heart:  Regular rhythm, S1 and S2 normal, no murmur, rub or gallop   Abdomen:   Soft, non-tender, no masses, no organomegaly   Vascular: Pulses 2+ and symmetric all extremities, no carotid bruit or jugular vein distention   Musculoskeletal:  Skin: No injury or deformity  Skin color, texture, turgor normal, no rashes or lesions, no cyanosis or edema   Extremities: Extremities normal, atraumatic, pulses normal   Behavior/Emotional: Appropriate, cooperative       Lab Results   Component Value Date    WBC 4.90 05/07/2019    HGB 13.3 05/07/2019     05/07/2019    CHOL 201 (H) 11/30/2017    TRIG 115 11/30/2017    HDL 61 11/30/2017    LDLCALC 117 (H) 11/30/2017    ALT 35 05/07/2019    AST 41 05/07/2019     05/07/2019    K 4.2 05/07/2019    CREATININE 0.9 05/07/2019    TSH 1.23 07/26/2016          ECG   sinus rhythm  Within normal limits     ASSESSMENT/PLAN    Problem List Items Addressed This Visit        High    Essential hypertension - Primary    Current Assessment & Plan     Her blood pressure is excellent on telmisartan and a low-salt diet.         Relevant Orders    ECG 12 LEAD-OFFICE PERFORMED (Completed)       Medium    Dyslipidemia    Overview     2009 " cath: neg CAD  Calcium score: zero    2020: 10 year risk for ASCVD: 2%         Current Assessment & Plan     We will continue her on dietary therapy alone.            Low    Palpitations    Current Assessment & Plan     She has no palpitations at current time.  She will contact me if they recur.                Return in about 1 year (around 11/17/2021).    Sukh Sandra MD  11/17/2020

## 2020-11-17 NOTE — PROGRESS NOTES
Cardiology Note       Reason for visit:   Chief Complaint   Patient presents with   • Hypertension      HPI   Corrina Conteh is a 69 y.o. female who presents for scheduled cardiovascular follow-up in the office.    I last saw her 1 year ago.  Unfortunately her mother  of complications of COVID-19 in May.  The patient has been active and involved with her [employer] in an online fashion.  She still has her headaches which are without change.  She exercises by walking or using the palatine or ballet bar on a regular basis.  Her 16-year-old granddaughter stays with her during the day during her virtual schooling.  Her gastrointestinal issues have improved after treatment of probiotic.  She denies cardiac symptoms of chest pain, shortness breath, palpitations, lightheadedness or syncope.  Her weight is stable.  Her diet is excellent.  Her blood pressure has been under nice control on telmisartan.  Her lipid profile is followed by her primary care physician.      History reviewed. No pertinent past medical history.  History reviewed. No pertinent surgical history.  Social History     Socioeconomic History   • Marital status:      Spouse name: None   • Number of children: None   • Years of education: None   • Highest education level: None   Occupational History   • None   Social Needs   • Financial resource strain: None   • Food insecurity     Worry: None     Inability: None   • Transportation needs     Medical: None     Non-medical: None   Tobacco Use   • Smoking status: Never Smoker   • Smokeless tobacco: Never Used   Substance and Sexual Activity   • Alcohol use: No   • Drug use: No   • Sexual activity: Defer   Lifestyle   • Physical activity     Days per week: None     Minutes per session: None   • Stress: None   Relationships   • Social connections     Talks on phone: None     Gets together: None     Attends Taoist service: None     Active member of club or organization: None     Attends meetings of  "clubs or organizations: None     Relationship status: None   • Intimate partner violence     Fear of current or ex partner: None     Emotionally abused: None     Physically abused: None     Forced sexual activity: None   Other Topics Concern   • None   Social History Narrative   • None     History reviewed. No pertinent family history.  Hydromorphone, Nsaids (non-steroidal anti-inflammatory drug), Penicillins, and Sulfa (sulfonamide antibiotics)  Current Outpatient Medications   Medication Sig Dispense Refill   • ergocalciferol (VITAMIN D2) 50,000 unit capsule Take 1 capsule by mouth every 30 (thirty) days.       • telmisartan (MICARDIS) 40 mg tablet TAKE 1 TABLET BY MOUTH EVERY DAY 30 tablet 5   • topiramate 100 mg capsule,extended release 24hr Take 100 mg by mouth daily.         No current facility-administered medications for this visit.        Review of Systems   Constitution: Negative for diaphoresis, malaise/fatigue, weight gain and weight loss.   HENT: Negative for nosebleeds.    Eyes: Negative for blurred vision.   Cardiovascular: Negative for chest pain, claudication, dyspnea on exertion, leg swelling, orthopnea, palpitations and syncope.   Respiratory: Negative for cough and shortness of breath.    Hematologic/Lymphatic: Negative for adenopathy.   Skin: Negative for color change.   Musculoskeletal: Positive for neck pain. Negative for muscle cramps.   Gastrointestinal: Positive for abdominal pain. Negative for heartburn.   Genitourinary: Negative for nocturia.   Neurological: Positive for headaches. Negative for light-headedness.   Psychiatric/Behavioral: Negative for altered mental status.     Objective   Vitals:    11/17/20 1403   BP: 118/60   BP Location: Left upper arm   Patient Position: Sitting   Pulse: 67   Weight: 46.4 kg (102 lb 4.8 oz)   Height: 1.676 m (5' 6\")     Weight: 46.4 kg (102 lb 4.8 oz)     Physical Exam:    General Appearance:  Alert, no distress   Head:  Normocephalic, without obvious " abnormality, atraumatic   Eyes:  Conjunctiva/corneas clear, EOM's intact   Neck: No thyroid enlargement. No JVD. No bruits    Lungs:   Clear to auscultation bilaterally, respirations unlabored, no rales, no wheezing   Heart:  Regular rhythm, S1 and S2 normal, no murmur, rub or gallop   Abdomen:   Soft, non-tender, no masses, no organomegaly   Vascular: Pulses 2+ and symmetric all extremities, no carotid bruit or jugular vein distention   Musculoskeletal:  Skin: No injury or deformity  Skin color, texture, turgor normal, no rashes or lesions, no cyanosis or edema   Extremities: Extremities normal, atraumatic, pulses normal   Behavior/Emotional: Appropriate, cooperative       Lab Results   Component Value Date    WBC 4.90 05/07/2019    HGB 13.3 05/07/2019     05/07/2019    CHOL 201 (H) 11/30/2017    TRIG 115 11/30/2017    HDL 61 11/30/2017    LDLCALC 117 (H) 11/30/2017    ALT 35 05/07/2019    AST 41 05/07/2019     05/07/2019    K 4.2 05/07/2019    CREATININE 0.9 05/07/2019    TSH 1.23 07/26/2016          ECG   sinus rhythm  Within normal limits     ASSESSMENT/PLAN    Problem List Items Addressed This Visit        High    Essential hypertension - Primary    Current Assessment & Plan     Her blood pressure is excellent on telmisartan and a low-salt diet.         Relevant Orders    ECG 12 LEAD-OFFICE PERFORMED (Completed)       Medium    Dyslipidemia    Overview     2009 cath: neg CAD  Calcium score: zero    2020: 10 year risk for ASCVD: 2%         Current Assessment & Plan     We will continue her on dietary therapy alone.            Low    Palpitations    Current Assessment & Plan     She has no palpitations at current time.  She will contact me if they recur.                Return in about 1 year (around 11/17/2021).    Sukh Sandra MD  11/17/2020

## 2020-11-22 PROCEDURE — 82274 ASSAY TEST FOR BLOOD FECAL: CPT | Performed by: INTERNAL MEDICINE

## 2020-11-30 ENCOUNTER — LAB REQUISITION (OUTPATIENT)
Dept: LAB | Facility: HOSPITAL | Age: 70
End: 2020-11-30
Attending: INTERNAL MEDICINE
Payer: MEDICARE

## 2020-11-30 DIAGNOSIS — Z12.11 ENCOUNTER FOR SCREENING FOR MALIGNANT NEOPLASM OF COLON: ICD-10-CM

## 2020-11-30 DIAGNOSIS — R14.0 ABDOMINAL DISTENSION (GASEOUS): ICD-10-CM

## 2020-11-30 DIAGNOSIS — K58.0 IRRITABLE BOWEL SYNDROME WITH DIARRHEA: ICD-10-CM

## 2020-11-30 DIAGNOSIS — R19.4 CHANGE IN BOWEL HABIT: ICD-10-CM

## 2020-12-01 LAB — HEMOCCULT STL QL IA: NEGATIVE

## 2021-03-08 ENCOUNTER — TELEPHONE (OUTPATIENT)
Dept: CARDIOLOGY | Facility: CLINIC | Age: 71
End: 2021-03-08

## 2021-03-08 NOTE — TELEPHONE ENCOUNTER
Pt stopped past office to provide change of pharmacy info     [facility]    Please advise

## 2021-03-09 RX ORDER — TELMISARTAN 40 MG/1
40 TABLET ORAL
Qty: 90 TABLET | Refills: 3 | Status: SHIPPED | OUTPATIENT
Start: 2021-03-09 | End: 2021-04-05

## 2021-03-09 NOTE — TELEPHONE ENCOUNTER
Medicine Refill Request    Last Office Visit: Visit date not found  Last Telemedicine Visit: Visit date not found    Phamr req refill on telmisartan       Current Outpatient Medications:   •  ergocalciferol (VITAMIN D2) 50,000 unit capsule, Take 1 capsule by mouth every 30 (thirty) days.  , Disp: , Rfl:   •  telmisartan (MICARDIS) 40 mg tablet, TAKE 1 TABLET BY MOUTH EVERY DAY, Disp: 30 tablet, Rfl: 5  •  topiramate 100 mg capsule,extended release 24hr, Take 100 mg by mouth daily.  , Disp: , Rfl:       BP Readings from Last 3 Encounters:   11/17/20 118/60   10/08/19 120/60   10/09/18 110/80       Recent Lab results:  Lab Results   Component Value Date    CHOL 201 (H) 11/30/2017   ,   Lab Results   Component Value Date    HDL 61 11/30/2017   ,   Lab Results   Component Value Date    LDLCALC 117 (H) 11/30/2017   ,   Lab Results   Component Value Date    TRIG 115 11/30/2017        Lab Results   Component Value Date    GLUCOSE 87 05/07/2019   , No results found for: HGBA1C      Lab Results   Component Value Date    CREATININE 0.9 05/07/2019       Lab Results   Component Value Date    TSH 1.23 07/26/2016

## 2021-04-05 RX ORDER — TELMISARTAN 40 MG/1
TABLET ORAL
Qty: 90 TABLET | Refills: 1 | Status: SHIPPED | OUTPATIENT
Start: 2021-04-05 | End: 2021-11-30 | Stop reason: SDUPTHER

## 2021-04-06 NOTE — TELEPHONE ENCOUNTER
Refill had previously been sent to [place] [facility] on 3/9/2021 but this is different pharmacy so sent to [facility].

## 2021-04-13 DIAGNOSIS — Z23 ENCOUNTER FOR IMMUNIZATION: ICD-10-CM

## 2021-09-08 ENCOUNTER — TELEPHONE (OUTPATIENT)
Dept: SCHEDULING | Facility: CLINIC | Age: 71
End: 2021-09-08

## 2021-09-08 NOTE — TELEPHONE ENCOUNTER
Corrina called to request an appt on 11/30, she has another appt @1pm and wanted to know if the Dr could see her this day as well     She can be reached @[phone]

## 2021-09-08 NOTE — TELEPHONE ENCOUNTER
Patient is calling to confirm her 11/30 appt with Dr. Sandra at 220 PM. She asked me to tell Rosalva thank you for helping to fit her in on this day.    She can be reached at: [phone]

## 2021-09-08 NOTE — TELEPHONE ENCOUNTER
I called pt and left vm letting her know I have given her an appt for 11/30/21 @ 2:20pm. I asked her to c/b to confirm thanks

## 2021-09-09 ENCOUNTER — TRANSCRIBE ORDERS (OUTPATIENT)
Dept: LAB | Facility: HOSPITAL | Age: 71
End: 2021-09-09
Payer: COMMERCIAL

## 2021-09-09 ENCOUNTER — APPOINTMENT (OUTPATIENT)
Dept: LAB | Facility: HOSPITAL | Age: 71
End: 2021-09-09
Attending: INTERNAL MEDICINE
Payer: COMMERCIAL

## 2021-09-09 DIAGNOSIS — A69.23 ARTHRITIS DUE TO LYME DISEASE (CMS/HCC): Primary | ICD-10-CM

## 2021-09-09 DIAGNOSIS — A69.23 ARTHRITIS DUE TO LYME DISEASE (CMS/HCC): ICD-10-CM

## 2021-09-09 PROCEDURE — 36415 COLL VENOUS BLD VENIPUNCTURE: CPT

## 2021-09-09 PROCEDURE — 86618 LYME DISEASE ANTIBODY: CPT

## 2021-09-10 LAB — B BURGDOR AB SER IA-ACNC: 0.13 RATIO

## 2021-10-01 ENCOUNTER — TRANSCRIBE ORDERS (OUTPATIENT)
Dept: RADIOLOGY | Facility: HOSPITAL | Age: 71
End: 2021-10-01
Payer: COMMERCIAL

## 2021-10-01 ENCOUNTER — HOSPITAL ENCOUNTER (OUTPATIENT)
Dept: RADIOLOGY | Facility: HOSPITAL | Age: 71
Discharge: HOME | End: 2021-10-01
Attending: FAMILY MEDICINE
Payer: COMMERCIAL

## 2021-10-01 DIAGNOSIS — Z12.31 ENCOUNTER FOR SCREENING MAMMOGRAM FOR MALIGNANT NEOPLASM OF BREAST: Primary | ICD-10-CM

## 2021-10-01 DIAGNOSIS — Z12.31 ENCOUNTER FOR SCREENING MAMMOGRAM FOR MALIGNANT NEOPLASM OF BREAST: ICD-10-CM

## 2021-10-01 PROCEDURE — 77067 SCR MAMMO BI INCL CAD: CPT

## 2021-10-05 ENCOUNTER — TRANSCRIBE ORDERS (OUTPATIENT)
Dept: RADIOLOGY | Facility: HOSPITAL | Age: 71
End: 2021-10-05
Payer: COMMERCIAL

## 2021-10-05 DIAGNOSIS — R92.8 OTHER ABNORMAL AND INCONCLUSIVE FINDINGS ON DIAGNOSTIC IMAGING OF BREAST: Primary | ICD-10-CM

## 2021-10-06 ENCOUNTER — HOSPITAL ENCOUNTER (OUTPATIENT)
Dept: RADIOLOGY | Facility: HOSPITAL | Age: 71
Discharge: HOME | End: 2021-10-06
Attending: RADIOLOGY
Payer: COMMERCIAL

## 2021-10-06 DIAGNOSIS — R92.8 OTHER ABNORMAL AND INCONCLUSIVE FINDINGS ON DIAGNOSTIC IMAGING OF BREAST: ICD-10-CM

## 2021-10-06 PROCEDURE — G0279 TOMOSYNTHESIS, MAMMO: HCPCS | Mod: LT

## 2021-10-06 PROCEDURE — 77065 DX MAMMO INCL CAD UNI: CPT | Mod: LT

## 2021-11-30 ENCOUNTER — OFFICE VISIT (OUTPATIENT)
Dept: CARDIOLOGY | Facility: CLINIC | Age: 71
End: 2021-11-30
Payer: COMMERCIAL

## 2021-11-30 VITALS
DIASTOLIC BLOOD PRESSURE: 80 MMHG | HEART RATE: 64 BPM | RESPIRATION RATE: 16 BRPM | OXYGEN SATURATION: 96 % | SYSTOLIC BLOOD PRESSURE: 124 MMHG | BODY MASS INDEX: 16.44 KG/M2 | WEIGHT: 102.3 LBS | HEIGHT: 66 IN

## 2021-11-30 DIAGNOSIS — I10 ESSENTIAL HYPERTENSION: Primary | ICD-10-CM

## 2021-11-30 DIAGNOSIS — R00.2 PALPITATIONS: ICD-10-CM

## 2021-11-30 DIAGNOSIS — E78.5 DYSLIPIDEMIA: ICD-10-CM

## 2021-11-30 PROCEDURE — 3079F DIAST BP 80-89 MM HG: CPT | Performed by: INTERNAL MEDICINE

## 2021-11-30 PROCEDURE — 93000 ELECTROCARDIOGRAM COMPLETE: CPT | Performed by: INTERNAL MEDICINE

## 2021-11-30 PROCEDURE — 3008F BODY MASS INDEX DOCD: CPT | Performed by: INTERNAL MEDICINE

## 2021-11-30 PROCEDURE — 99214 OFFICE O/P EST MOD 30 MIN: CPT | Performed by: INTERNAL MEDICINE

## 2021-11-30 PROCEDURE — 3074F SYST BP LT 130 MM HG: CPT | Performed by: INTERNAL MEDICINE

## 2021-11-30 RX ORDER — COVID-19 TEST SPECIMEN COLLECT
1 MISCELLANEOUS MISCELLANEOUS SEE ADMIN INSTRUCTIONS
COMMUNITY
Start: 2021-10-03 | End: 2022-11-29

## 2021-11-30 RX ORDER — EZETIMIBE 10 MG/1
10 TABLET ORAL NIGHTLY
Qty: 90 TABLET | Refills: 3 | Status: SHIPPED | OUTPATIENT
Start: 2021-11-30 | End: 2022-09-27 | Stop reason: SDUPTHER

## 2021-11-30 RX ORDER — TELMISARTAN 40 MG/1
40 TABLET ORAL
Qty: 90 TABLET | Refills: 3 | Status: SHIPPED | OUTPATIENT
Start: 2021-11-30 | End: 2022-09-27 | Stop reason: SDUPTHER

## 2021-12-03 ENCOUNTER — TRANSCRIBE ORDERS (OUTPATIENT)
Dept: SCHEDULING | Age: 71
End: 2021-12-03
Payer: COMMERCIAL

## 2021-12-03 DIAGNOSIS — R93.3 ABNORMAL FINDINGS ON DIAGNOSTIC IMAGING OF OTHER PARTS OF DIGESTIVE TRACT: ICD-10-CM

## 2021-12-03 DIAGNOSIS — K21.9 GASTRO-ESOPHAGEAL REFLUX DISEASE WITHOUT ESOPHAGITIS: ICD-10-CM

## 2021-12-03 DIAGNOSIS — R10.32 LEFT LOWER QUADRANT PAIN: Primary | ICD-10-CM

## 2021-12-03 PROCEDURE — 82274 ASSAY TEST FOR BLOOD FECAL: CPT | Performed by: INTERNAL MEDICINE

## 2021-12-06 ENCOUNTER — LAB REQUISITION (OUTPATIENT)
Dept: LAB | Facility: HOSPITAL | Age: 71
End: 2021-12-06
Attending: INTERNAL MEDICINE
Payer: COMMERCIAL

## 2021-12-06 DIAGNOSIS — K59.09 OTHER CONSTIPATION: ICD-10-CM

## 2021-12-06 DIAGNOSIS — Z12.11 ENCOUNTER FOR SCREENING FOR MALIGNANT NEOPLASM OF COLON: ICD-10-CM

## 2021-12-09 LAB — HEMOCCULT STL QL IA: NEGATIVE

## 2021-12-21 ENCOUNTER — HOSPITAL ENCOUNTER (OUTPATIENT)
Dept: RADIOLOGY | Facility: HOSPITAL | Age: 71
Discharge: HOME | End: 2021-12-21
Attending: INTERNAL MEDICINE
Payer: COMMERCIAL

## 2021-12-21 DIAGNOSIS — R93.3 ABNORMAL FINDINGS ON DIAGNOSTIC IMAGING OF OTHER PARTS OF DIGESTIVE TRACT: ICD-10-CM

## 2021-12-21 DIAGNOSIS — R10.32 LEFT LOWER QUADRANT PAIN: ICD-10-CM

## 2021-12-21 DIAGNOSIS — K21.9 GASTRO-ESOPHAGEAL REFLUX DISEASE WITHOUT ESOPHAGITIS: ICD-10-CM

## 2021-12-21 PROCEDURE — 74220 X-RAY XM ESOPHAGUS 1CNTRST: CPT

## 2022-01-06 ENCOUNTER — TRANSCRIBE ORDERS (OUTPATIENT)
Dept: RADIOLOGY | Facility: HOSPITAL | Age: 72
End: 2022-01-06

## 2022-01-06 ENCOUNTER — HOSPITAL ENCOUNTER (OUTPATIENT)
Dept: RADIOLOGY | Facility: HOSPITAL | Age: 72
Discharge: HOME | End: 2022-01-06
Attending: INTERNAL MEDICINE
Payer: COMMERCIAL

## 2022-01-06 DIAGNOSIS — M25.522 PAIN IN LEFT ELBOW: Primary | ICD-10-CM

## 2022-01-06 DIAGNOSIS — M25.522 PAIN IN LEFT ELBOW: ICD-10-CM

## 2022-01-06 PROCEDURE — 73080 X-RAY EXAM OF ELBOW: CPT | Mod: LT

## 2022-02-10 ENCOUNTER — TELEPHONE (OUTPATIENT)
Dept: CARDIOLOGY | Facility: HOSPITAL | Age: 72
End: 2022-02-10
Payer: COMMERCIAL

## 2022-02-14 ENCOUNTER — HOSPITAL ENCOUNTER (OUTPATIENT)
Dept: CARDIOLOGY | Facility: HOSPITAL | Age: 72
Discharge: HOME | End: 2022-02-14
Attending: PODIATRIST
Payer: COMMERCIAL

## 2022-02-14 DIAGNOSIS — I73.9 PERIPHERAL VASCULAR DISEASE, UNSPECIFIED (CMS/HCC): ICD-10-CM

## 2022-02-14 PROCEDURE — 93923 UPR/LXTR ART STDY 3+ LVLS: CPT

## 2022-02-14 PROCEDURE — 93923 UPR/LXTR ART STDY 3+ LVLS: CPT | Mod: 26 | Performed by: INTERNAL MEDICINE

## 2022-02-16 LAB
IMMEDIATE ARM BP: 106 MMHG
IMMEDIATE LEFT ABI: 1.14
IMMEDIATE LEFT TIBIAL: 121 MMHG
IMMEDIATE RIGHT ABI: 1.07
IMMEDIATE RIGHT TIBIAL: 113 MMHG
LEFT 1ST TOE INDEX: 0.94
LEFT 1ST TOE: 102 MMHG
LEFT ABI: 1.13
LEFT ARM BP: 100 MMHG
LEFT DORSALIS PEDIS INDEX: 1.13
LEFT DORSALIS PEDIS: 123 MMHG
LEFT LOW THIGH INDEX: 1.39
LEFT LOW THIGH: 151 MMHG
LEFT POST TIBIAL INDEX: 0.97
LEFT POSTERIOR TIBIAL: 106 MMHG
LEFT PROXIMAL CALF INDEX: 1.16
LEFT PROXIMAL CALF: 126 MMHG
LEFT TBI: 0.94
RIGHT 1ST TOE INDEX: 0.82
RIGHT 1ST TOE: 89 MMHG
RIGHT ABI: 1.07
RIGHT ARM BP: 109 MMHG
RIGHT DORSALIS PEDIS INDEX: 0.86
RIGHT DORSALIS PEDIS: 94 MMHG
RIGHT LOW THIGH INDEX: 1.33
RIGHT LOW THIGH: 145 MMHG
RIGHT POST TIBIAL INDEX: 1.07
RIGHT POSTERIOR TIBIAL: 117 MMHG
RIGHT PROXIMAL CALF INDEX: 1.21
RIGHT PROXIMAL CALF: 132 MMHG
RIGHT TBI: 0.82
TOE RAISES MIN: 3 MIN

## 2022-02-24 ENCOUNTER — TRANSCRIBE ORDERS (OUTPATIENT)
Dept: SCHEDULING | Age: 72
End: 2022-02-24

## 2022-02-24 DIAGNOSIS — H93.19 TINNITUS, UNSPECIFIED EAR: Primary | ICD-10-CM

## 2022-03-28 ENCOUNTER — TRANSCRIBE ORDERS (OUTPATIENT)
Dept: SCHEDULING | Age: 72
End: 2022-03-28

## 2022-03-28 DIAGNOSIS — R92.8 OTHER ABNORMAL AND INCONCLUSIVE FINDINGS ON DIAGNOSTIC IMAGING OF BREAST: Primary | ICD-10-CM

## 2022-04-14 ENCOUNTER — HOSPITAL ENCOUNTER (OUTPATIENT)
Dept: RADIOLOGY | Facility: HOSPITAL | Age: 72
Discharge: HOME | End: 2022-04-14
Attending: OTOLARYNGOLOGY
Payer: COMMERCIAL

## 2022-04-14 DIAGNOSIS — H93.19 TINNITUS, UNSPECIFIED EAR: ICD-10-CM

## 2022-04-14 PROCEDURE — A9585 GADOBUTROL INJECTION: HCPCS | Performed by: RADIOLOGY

## 2022-04-14 PROCEDURE — 70553 MRI BRAIN STEM W/O & W/DYE: CPT

## 2022-04-14 RX ORDER — GADOBUTROL 604.72 MG/ML
0.1 INJECTION INTRAVENOUS ONCE
Status: COMPLETED | OUTPATIENT
Start: 2022-04-14 | End: 2022-04-14

## 2022-04-14 RX ADMIN — GADOBUTROL 4.8 MMOL: 604.72 INJECTION INTRAVENOUS at 18:50

## 2022-05-10 ENCOUNTER — HOSPITAL ENCOUNTER (OUTPATIENT)
Dept: RADIOLOGY | Facility: HOSPITAL | Age: 72
Discharge: HOME | End: 2022-05-10
Attending: OBSTETRICS & GYNECOLOGY
Payer: COMMERCIAL

## 2022-05-10 DIAGNOSIS — R92.8 OTHER ABNORMAL AND INCONCLUSIVE FINDINGS ON DIAGNOSTIC IMAGING OF BREAST: ICD-10-CM

## 2022-05-10 PROCEDURE — G0279 TOMOSYNTHESIS, MAMMO: HCPCS | Mod: LT

## 2022-09-02 ENCOUNTER — TRANSCRIBE ORDERS (OUTPATIENT)
Dept: SCHEDULING | Age: 72
End: 2022-09-02

## 2022-09-02 DIAGNOSIS — M81.0 AGE-RELATED OSTEOPOROSIS WITHOUT CURRENT PATHOLOGICAL FRACTURE: Primary | ICD-10-CM

## 2022-09-02 DIAGNOSIS — R92.8 OTHER ABNORMAL AND INCONCLUSIVE FINDINGS ON DIAGNOSTIC IMAGING OF BREAST: Primary | ICD-10-CM

## 2022-09-27 RX ORDER — TELMISARTAN 40 MG/1
40 TABLET ORAL
Qty: 90 TABLET | Refills: 3 | Status: SHIPPED | OUTPATIENT
Start: 2022-09-27 | End: 2023-10-03

## 2022-09-27 RX ORDER — EZETIMIBE 10 MG/1
10 TABLET ORAL NIGHTLY
Qty: 90 TABLET | Refills: 3 | Status: SHIPPED | OUTPATIENT
Start: 2022-09-27 | End: 2023-09-25

## 2022-09-27 NOTE — TELEPHONE ENCOUNTER
Medicine Refill Request    Last Office: Visit date not found   Last Consult Visit: Visit date not found  Last Telemedicine Visit: Visit date not found    Pt requesting a refill of ezetimibe 10mg & telmisartan 40mg.    Current Outpatient Medications:     COVID-19 test specimen collect misc, 1 Tube See admin instr. for testing, Disp: , Rfl:     ergocalciferol (VITAMIN D2) 50,000 unit capsule, Take 1 capsule by mouth every 30 (thirty) days.  , Disp: , Rfl:     ezetimibe 10 mg tablet, Take 1 tablet (10 mg total) by mouth nightly., Disp: 90 tablet, Rfl: 3    telmisartan 40 mg tablet, Take 1 tablet (40 mg total) by mouth once daily., Disp: 90 tablet, Rfl: 3    topiramate 100 mg capsule,extended release 24hr, Take 100 mg by mouth daily.  , Disp: , Rfl:       BP Readings from Last 3 Encounters:   11/30/21 124/80   11/17/20 118/60   10/08/19 120/60       Recent Lab results:  Lab Results   Component Value Date    CHOL 201 (H) 11/30/2017   ,   Lab Results   Component Value Date    HDL 61 11/30/2017   ,   Lab Results   Component Value Date    LDLCALC 117 (H) 11/30/2017   ,   Lab Results   Component Value Date    TRIG 115 11/30/2017        Lab Results   Component Value Date    GLUCOSE 87 05/07/2019   , No results found for: HGBA1C      Lab Results   Component Value Date    CREATININE 0.9 05/07/2019       Lab Results   Component Value Date    TSH 1.23 07/26/2016

## 2022-09-28 ENCOUNTER — TELEPHONE (OUTPATIENT)
Dept: SCHEDULING | Facility: CLINIC | Age: 72
End: 2022-09-28
Payer: COMMERCIAL

## 2022-09-28 NOTE — TELEPHONE ENCOUNTER
Pt called to advise you that she found ezetimibe 10 mg tablets in her home.    No further action needed at this time. ty

## 2022-11-10 ENCOUNTER — HOSPITAL ENCOUNTER (OUTPATIENT)
Dept: RADIOLOGY | Facility: HOSPITAL | Age: 72
Discharge: HOME | End: 2022-11-10
Attending: OBSTETRICS & GYNECOLOGY
Payer: COMMERCIAL

## 2022-11-10 ENCOUNTER — HOSPITAL ENCOUNTER (OUTPATIENT)
Dept: RADIOLOGY | Facility: HOSPITAL | Age: 72
Discharge: HOME | End: 2022-11-10
Attending: INTERNAL MEDICINE
Payer: COMMERCIAL

## 2022-11-10 DIAGNOSIS — M81.0 AGE-RELATED OSTEOPOROSIS WITHOUT CURRENT PATHOLOGICAL FRACTURE: ICD-10-CM

## 2022-11-10 DIAGNOSIS — R92.8 OTHER ABNORMAL AND INCONCLUSIVE FINDINGS ON DIAGNOSTIC IMAGING OF BREAST: ICD-10-CM

## 2022-11-10 PROCEDURE — 77066 DX MAMMO INCL CAD BI: CPT

## 2022-11-10 PROCEDURE — 77080 DXA BONE DENSITY AXIAL: CPT

## 2022-12-01 ENCOUNTER — OFFICE VISIT (OUTPATIENT)
Dept: CARDIOLOGY | Facility: CLINIC | Age: 72
End: 2022-12-01
Payer: COMMERCIAL

## 2022-12-01 VITALS
OXYGEN SATURATION: 97 % | SYSTOLIC BLOOD PRESSURE: 114 MMHG | BODY MASS INDEX: 16.55 KG/M2 | RESPIRATION RATE: 18 BRPM | HEIGHT: 66 IN | WEIGHT: 103 LBS | HEART RATE: 69 BPM | DIASTOLIC BLOOD PRESSURE: 66 MMHG

## 2022-12-01 DIAGNOSIS — I10 ESSENTIAL HYPERTENSION: Primary | ICD-10-CM

## 2022-12-01 DIAGNOSIS — E78.5 DYSLIPIDEMIA: ICD-10-CM

## 2022-12-01 DIAGNOSIS — R00.2 PALPITATIONS: ICD-10-CM

## 2022-12-01 PROCEDURE — 3008F BODY MASS INDEX DOCD: CPT | Performed by: INTERNAL MEDICINE

## 2022-12-01 PROCEDURE — 99214 OFFICE O/P EST MOD 30 MIN: CPT | Performed by: INTERNAL MEDICINE

## 2022-12-01 PROCEDURE — 93000 ELECTROCARDIOGRAM COMPLETE: CPT | Performed by: INTERNAL MEDICINE

## 2022-12-01 RX ORDER — ROMOSOZUMAB-AQQG 105 MG/1.17ML
INJECTION, SOLUTION SUBCUTANEOUS
COMMUNITY
Start: 2022-11-21 | End: 2024-04-17 | Stop reason: ENTERED-IN-ERROR

## 2022-12-01 RX ORDER — BUTALBITAL, ACETAMINOPHEN AND CAFFEINE 50; 325; 40 MG/1; MG/1; MG/1
1 TABLET ORAL AS NEEDED
Status: ON HOLD | COMMUNITY
Start: 2022-11-18 | End: 2024-05-01 | Stop reason: ALTCHOICE

## 2022-12-01 ASSESSMENT — ENCOUNTER SYMPTOMS
LIGHT-HEADEDNESS: 0
HEARTBURN: 0
SHORTNESS OF BREATH: 0
CLAUDICATION: 0
ORTHOPNEA: 0
COLOR CHANGE: 0
ALTERED MENTAL STATUS: 0
SYNCOPE: 0
HEADACHES: 1
WEIGHT LOSS: 0
PALPITATIONS: 0
DYSPNEA ON EXERTION: 0
ADENOPATHY: 0
DIAPHORESIS: 0
COUGH: 0
BLURRED VISION: 0
MUSCLE CRAMPS: 0
WEIGHT GAIN: 0

## 2022-12-01 NOTE — PROGRESS NOTES
Cardiology Note       Reason for visit:   Chief Complaint   Patient presents with   • Essential hypertension      HPI   Corrina Conteh is a 71 y.o. female who presents for cardiovascular follow-up.    I last saw her 1 year ago.  She has no dramatic health changes.  She believes her blood pressure has been acceptable although it has been in the 120s when she has been seen in doctors offices recently.  This is higher than she is used to.  She participates in yoga.  She walks.  She worked at [employer] this summer which she thoroughly enjoys.  She eats a healthy vegan type diet.  Her weight is without change.  She denies chest pain, shortness of breath, palpitations, lightheadedness or syncope.  She continues to have episodic headaches and she has some knee discomfort.      History reviewed. No pertinent past medical history.  History reviewed. No pertinent surgical history.  Social History     Socioeconomic History   • Marital status:      Spouse name: None   • Number of children: None   • Years of education: None   • Highest education level: None   Tobacco Use   • Smoking status: Never   • Smokeless tobacco: Never   Vaping Use   • Vaping Use: Never used   Substance and Sexual Activity   • Alcohol use: No   • Drug use: No   • Sexual activity: Defer     History reviewed. No pertinent family history.  Hydromorphone, Nsaids (non-steroidal anti-inflammatory drug), Penicillins, and Sulfa (sulfonamide antibiotics)  Current Outpatient Medications   Medication Sig Dispense Refill   • butalbital-acetaminophen-caff (FIORICET, ESGIC) -40 mg per tablet      • cholecalciferol, vitamin D3, (VITAMIN D3 ORAL) Take by mouth daily.     • ezetimibe (ZETIA) 10 mg tablet Take 1 tablet (10 mg total) by mouth nightly. 90 tablet 3   • telmisartan (MICARDIS) 40 mg tablet Take 1 tablet (40 mg total) by mouth once daily. 90 tablet 3   • topiramate 100 mg capsule,extended release 24hr Take 100 mg by mouth daily.       • ergocalciferol  "(VITAMIN D2) 50,000 unit capsule Take 1 capsule by mouth every 30 (thirty) days.       • EVENITY 210mg/2.34mL ( 105mg/1.17mLx2) syringe        No current facility-administered medications for this visit.       Review of Systems   Constitutional: Negative for diaphoresis, malaise/fatigue, weight gain and weight loss.   HENT: Negative for nosebleeds.    Eyes: Negative for blurred vision.   Cardiovascular: Negative for chest pain, claudication, dyspnea on exertion, leg swelling, orthopnea, palpitations and syncope.   Respiratory: Negative for cough and shortness of breath.    Hematologic/Lymphatic: Negative for adenopathy.   Skin: Negative for color change.   Musculoskeletal: Positive for arthritis and joint pain. Negative for muscle cramps.   Gastrointestinal: Negative for heartburn.   Genitourinary: Negative for nocturia.   Neurological: Positive for headaches. Negative for light-headedness.   Psychiatric/Behavioral: Negative for altered mental status.     Objective   Vitals:    12/01/22 1248   BP: 114/66   BP Location: Left upper arm   Patient Position: Sitting   Pulse: 69   Resp: 18   SpO2: 97%   Weight: 46.7 kg (103 lb)   Height: 1.676 m (5' 6\")     Weight: 46.7 kg (103 lb)     Physical Exam:    General Appearance:  Alert, no distress   Head:  Normocephalic, without obvious abnormality, atraumatic   Eyes:  Conjunctiva/corneas clear, EOM's intact   Neck: No thyroid enlargement. No JVD. No bruits    Lungs:   Clear to auscultation bilaterally, respirations unlabored, no rales, no wheezing   Heart:  Regular rhythm, S1 and S2 normal, no murmur, rub or gallop   Abdomen:   Soft, non-tender, no masses, no organomegaly   Vascular: Pulses 2+ and symmetric all extremities, no carotid bruit or jugular vein distention   Musculoskeletal:  Skin: No injury or deformity  Skin color, texture, turgor normal, no rashes or lesions, no cyanosis or edema   Extremities: Extremities normal, atraumatic, pulses normal   Behavior/Emotional: " Appropriate, cooperative       Lab Results   Component Value Date    WBC 4.90 05/07/2019    HGB 13.3 05/07/2019     05/07/2019    CHOL 201 (H) 11/30/2017    TRIG 115 11/30/2017    HDL 61 11/30/2017    LDLCALC 117 (H) 11/30/2017    ALT 35 05/07/2019    AST 41 05/07/2019     05/07/2019    K 4.2 05/07/2019    CREATININE 0.9 05/07/2019    TSH 1.23 07/26/2016          ECG   sinus rhythm  Within normal limits     ASSESSMENT/PLAN    Problem List Items Addressed This Visit        High    Essential hypertension - Primary    Current Assessment & Plan     Her blood pressure is fine on telmisartan and a low salt diet.         Relevant Orders    ECG 12 LEAD-OFFICE PERFORMED (Completed)       Medium    Dyslipidemia    Overview     No FH of MI, cigs, dm    2009 cath: neg CAD    2016 Calcium score: zero    2021: 10 year risk for ASCVD: 10%    Bad reaction to statin in the past         Current Assessment & Plan     I will check a lipid profile on Zetia.  Fortunately she tolerates this agent.  She eats a healthy diet.  I will contact her with results.         Relevant Orders    Lipid panel       Low    Palpitations    Current Assessment & Plan     He has no palpitations at present time.  She eats an excellent diet.             Return in about 1 year (around 12/1/2023).    Sukh Sandra MD  12/1/2022

## 2022-12-01 NOTE — ASSESSMENT & PLAN NOTE
I will check a lipid profile on Zetia.  Fortunately she tolerates this agent.  She eats a healthy diet.  I will contact her with results.

## 2022-12-01 NOTE — LETTER
December 1, 2022     Venus Aparicio MD  [address] [facility]  [address]  [facility] [address]    Patient: Corrina Conteh  YOB: 1950  Date of Visit: 12/1/2022      Dear Dr. Aparicio:    Thank you for referring Corrina Conteh to me for evaluation. Below are my notes for this consultation.    If you have questions, please do not hesitate to call me. I look forward to following your patient along with you.         Sincerely,        Sukh Sandra MD        CC: No Recipients  Sukh Sandra MD  12/1/2022  1:24 PM  Signed     Cardiology Note       Reason for visit:   Chief Complaint   Patient presents with   • Essential hypertension      HPI    Corrina Conteh is a 71 y.o. female who presents for cardiovascular follow-up.    I last saw her 1 year ago.  She has no dramatic health changes.  She believes her blood pressure has been acceptable although it has been in the 120s when she has been seen in doctors offices recently.  This is higher than she is used to.  She participates in yoga.  She walks.  She worked at [employer] this summer which she thoroughly enjoys.  She eats a healthy vegan type diet.  Her weight is without change.  She denies chest pain, shortness of breath, palpitations, lightheadedness or syncope.  She continues to have episodic headaches and she has some knee discomfort.      History reviewed. No pertinent past medical history.  History reviewed. No pertinent surgical history.  Social History     Socioeconomic History   • Marital status:      Spouse name: None   • Number of children: None   • Years of education: None   • Highest education level: None   Tobacco Use   • Smoking status: Never   • Smokeless tobacco: Never   Vaping Use   • Vaping Use: Never used   Substance and Sexual Activity   • Alcohol use: No   • Drug use: No   • Sexual activity: Defer     History reviewed. No pertinent family history.  Hydromorphone, Nsaids (non-steroidal anti-inflammatory drug), Penicillins, and Sulfa  "(sulfonamide antibiotics)  Current Outpatient Medications   Medication Sig Dispense Refill   • butalbital-acetaminophen-caff (FIORICET, ESGIC) -40 mg per tablet      • cholecalciferol, vitamin D3, (VITAMIN D3 ORAL) Take by mouth daily.     • ezetimibe (ZETIA) 10 mg tablet Take 1 tablet (10 mg total) by mouth nightly. 90 tablet 3   • telmisartan (MICARDIS) 40 mg tablet Take 1 tablet (40 mg total) by mouth once daily. 90 tablet 3   • topiramate 100 mg capsule,extended release 24hr Take 100 mg by mouth daily.       • ergocalciferol (VITAMIN D2) 50,000 unit capsule Take 1 capsule by mouth every 30 (thirty) days.       • EVENITY 210mg/2.34mL ( 105mg/1.17mLx2) syringe        No current facility-administered medications for this visit.       Review of Systems   Constitutional: Negative for diaphoresis, malaise/fatigue, weight gain and weight loss.   HENT: Negative for nosebleeds.    Eyes: Negative for blurred vision.   Cardiovascular: Negative for chest pain, claudication, dyspnea on exertion, leg swelling, orthopnea, palpitations and syncope.   Respiratory: Negative for cough and shortness of breath.    Hematologic/Lymphatic: Negative for adenopathy.   Skin: Negative for color change.   Musculoskeletal: Positive for arthritis and joint pain. Negative for muscle cramps.   Gastrointestinal: Negative for heartburn.   Genitourinary: Negative for nocturia.   Neurological: Positive for headaches. Negative for light-headedness.   Psychiatric/Behavioral: Negative for altered mental status.     Objective    Vitals:    12/01/22 1248   BP: 114/66   BP Location: Left upper arm   Patient Position: Sitting   Pulse: 69   Resp: 18   SpO2: 97%   Weight: 46.7 kg (103 lb)   Height: 1.676 m (5' 6\")     Weight: 46.7 kg (103 lb)     Physical Exam:    General Appearance:  Alert, no distress   Head:  Normocephalic, without obvious abnormality, atraumatic   Eyes:  Conjunctiva/corneas clear, EOM's intact   Neck: No thyroid enlargement. No " JVD. No bruits    Lungs:   Clear to auscultation bilaterally, respirations unlabored, no rales, no wheezing   Heart:  Regular rhythm, S1 and S2 normal, no murmur, rub or gallop   Abdomen:   Soft, non-tender, no masses, no organomegaly   Vascular: Pulses 2+ and symmetric all extremities, no carotid bruit or jugular vein distention   Musculoskeletal:  Skin: No injury or deformity  Skin color, texture, turgor normal, no rashes or lesions, no cyanosis or edema   Extremities: Extremities normal, atraumatic, pulses normal   Behavior/Emotional: Appropriate, cooperative       Lab Results   Component Value Date    WBC 4.90 05/07/2019    HGB 13.3 05/07/2019     05/07/2019    CHOL 201 (H) 11/30/2017    TRIG 115 11/30/2017    HDL 61 11/30/2017    LDLCALC 117 (H) 11/30/2017    ALT 35 05/07/2019    AST 41 05/07/2019     05/07/2019    K 4.2 05/07/2019    CREATININE 0.9 05/07/2019    TSH 1.23 07/26/2016          ECG   sinus rhythm  Within normal limits     ASSESSMENT/PLAN    Problem List Items Addressed This Visit        High    Essential hypertension - Primary    Current Assessment & Plan     Her blood pressure is fine on telmisartan and a low salt diet.         Relevant Orders    ECG 12 LEAD-OFFICE PERFORMED (Completed)       Medium    Dyslipidemia    Overview     No FH of MI, cigs, dm    2009 cath: neg CAD    2016 Calcium score: zero    2021: 10 year risk for ASCVD: 10%    Bad reaction to statin in the past         Current Assessment & Plan     I will check a lipid profile on Zetia.  Fortunately she tolerates this agent.  She eats a healthy diet.  I will contact her with results.         Relevant Orders    Lipid panel       Low    Palpitations    Current Assessment & Plan     He has no palpitations at present time.  She eats an excellent diet.             Return in about 1 year (around 12/1/2023).    Sukh Sandra MD  12/1/2022

## 2023-03-15 ENCOUNTER — LAB REQUISITION (OUTPATIENT)
Dept: LAB | Facility: HOSPITAL | Age: 73
End: 2023-03-15
Attending: INTERNAL MEDICINE
Payer: COMMERCIAL

## 2023-03-15 DIAGNOSIS — R93.3 ABNORMAL FINDINGS ON DIAGNOSTIC IMAGING OF OTHER PARTS OF DIGESTIVE TRACT: ICD-10-CM

## 2023-03-15 DIAGNOSIS — R10.11 RIGHT UPPER QUADRANT PAIN: ICD-10-CM

## 2023-03-15 DIAGNOSIS — Z12.11 ENCOUNTER FOR SCREENING FOR MALIGNANT NEOPLASM OF COLON: ICD-10-CM

## 2023-03-15 DIAGNOSIS — R94.5 ABNORMAL RESULTS OF LIVER FUNCTION STUDIES: ICD-10-CM

## 2023-03-15 LAB — HEMOCCULT STL QL IA: NEGATIVE

## 2023-03-15 PROCEDURE — 82274 ASSAY TEST FOR BLOOD FECAL: CPT | Performed by: INTERNAL MEDICINE

## 2023-05-15 ENCOUNTER — TRANSCRIBE ORDERS (OUTPATIENT)
Dept: SCHEDULING | Age: 73
End: 2023-05-15

## 2023-05-15 DIAGNOSIS — H57.12 OCULAR PAIN, LEFT EYE: Primary | ICD-10-CM

## 2023-11-28 ENCOUNTER — TRANSCRIBE ORDERS (OUTPATIENT)
Dept: RADIOLOGY | Age: 73
End: 2023-11-28

## 2023-11-28 ENCOUNTER — HOSPITAL ENCOUNTER (OUTPATIENT)
Dept: RADIOLOGY | Age: 73
Discharge: HOME | End: 2023-11-28
Attending: INTERNAL MEDICINE
Payer: COMMERCIAL

## 2023-11-28 DIAGNOSIS — M06.09 RHEUMATOID ARTHRITIS WITHOUT RHEUMATOID FACTOR, MULTIPLE SITES (CMS/HCC): ICD-10-CM

## 2023-11-28 DIAGNOSIS — M06.09 RHEUMATOID ARTHRITIS WITHOUT RHEUMATOID FACTOR, MULTIPLE SITES (CMS/HCC): Primary | ICD-10-CM

## 2023-11-28 PROCEDURE — 73140 X-RAY EXAM OF FINGER(S): CPT | Mod: LT

## 2023-11-28 PROCEDURE — 73130 X-RAY EXAM OF HAND: CPT | Mod: LT

## 2023-12-01 ENCOUNTER — OFFICE VISIT (OUTPATIENT)
Dept: CARDIOLOGY | Facility: CLINIC | Age: 73
End: 2023-12-01
Payer: COMMERCIAL

## 2023-12-01 VITALS
OXYGEN SATURATION: 99 % | HEIGHT: 66 IN | RESPIRATION RATE: 18 BRPM | BODY MASS INDEX: 15.27 KG/M2 | DIASTOLIC BLOOD PRESSURE: 62 MMHG | HEART RATE: 71 BPM | SYSTOLIC BLOOD PRESSURE: 104 MMHG | WEIGHT: 95 LBS

## 2023-12-01 DIAGNOSIS — E78.5 DYSLIPIDEMIA: ICD-10-CM

## 2023-12-01 DIAGNOSIS — I10 ESSENTIAL HYPERTENSION: Primary | ICD-10-CM

## 2023-12-01 DIAGNOSIS — R00.2 PALPITATIONS: ICD-10-CM

## 2023-12-01 PROCEDURE — 99214 OFFICE O/P EST MOD 30 MIN: CPT | Performed by: INTERNAL MEDICINE

## 2023-12-01 PROCEDURE — 3074F SYST BP LT 130 MM HG: CPT | Performed by: INTERNAL MEDICINE

## 2023-12-01 PROCEDURE — 93000 ELECTROCARDIOGRAM COMPLETE: CPT | Performed by: INTERNAL MEDICINE

## 2023-12-01 PROCEDURE — 3008F BODY MASS INDEX DOCD: CPT | Performed by: INTERNAL MEDICINE

## 2023-12-01 PROCEDURE — 3078F DIAST BP <80 MM HG: CPT | Performed by: INTERNAL MEDICINE

## 2023-12-01 RX ORDER — ALENDRONATE SODIUM 70 MG/1
70 TABLET ORAL WEEKLY
COMMUNITY
Start: 2023-11-21 | End: 2024-12-02

## 2023-12-01 RX ORDER — FAMOTIDINE 40 MG/1
TABLET, FILM COATED ORAL
COMMUNITY
Start: 2023-11-06 | End: 2024-04-17 | Stop reason: ENTERED-IN-ERROR

## 2023-12-01 ASSESSMENT — ENCOUNTER SYMPTOMS
PALPITATIONS: 0
ORTHOPNEA: 0
COLOR CHANGE: 0
SHORTNESS OF BREATH: 0
LIGHT-HEADEDNESS: 0
NERVOUS/ANXIOUS: 1
SYNCOPE: 0
COUGH: 0
ALTERED MENTAL STATUS: 0
WEIGHT LOSS: 0
ADENOPATHY: 0
BLURRED VISION: 0
HEARTBURN: 0
DYSPNEA ON EXERTION: 0
DIAPHORESIS: 0
INSOMNIA: 1
WEIGHT GAIN: 0
CLAUDICATION: 0
MUSCLE CRAMPS: 0

## 2023-12-01 NOTE — LETTER
December 1, 2023     Venus Aparicio MD  [address] [facility]  [address]  [facility] [address]    Patient: Corrina Conteh  YOB: 1950  Date of Visit: 12/1/2023      Dear Dr. Aparicio:    Thank you for referring Corrina Conteh to me for evaluation. Below are my notes for this consultation.    If you have questions, please do not hesitate to call me. I look forward to following your patient along with you.         Sincerely,        Sukh Sandra MD        CC: No Recipients    Sukh Sandra MD  12/1/2023  1:59 PM  Signed     Cardiology Note       Reason for visit:   Chief Complaint   Patient presents with   • Essential hypertension      HPI   Corrina Conteh is a 72 y.o. female who presents for scheduled follow-up.    I last saw her 1 year ago.  She has not had any significant health changes.  She takes care of her  who has early Alzheimer's.  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  She is active as she still participates in yoga and goes for walks.  She denies cardiac symptoms of chest pain, shortness breath, palpitations, lightheadedness or syncope.  She eats a very healthy diet and her weight is low and stable.  She believes her blood pressure has been fine.  She continues on Zetia.      History reviewed. No pertinent past medical history.  History reviewed. No pertinent surgical history.  Social History     Socioeconomic History   • Marital status:      Spouse name: None   • Number of children: None   • Years of education: None   • Highest education level: None   Tobacco Use   • Smoking status: Never   • Smokeless tobacco: Never   Vaping Use   • Vaping Use: Never used   Substance and Sexual Activity   • Alcohol use: No   • Drug use: No   • Sexual activity: Defer     History reviewed. No pertinent family history.  Hydromorphone, Nsaids (non-steroidal anti-inflammatory drug), Penicillins, and Sulfa (sulfonamide antibiotics)  Current Outpatient Medications  "  Medication Sig Dispense Refill   • alendronate (FOSAMAX) 70 mg tablet      • cholecalciferol, vitamin D3, (VITAMIN D3 ORAL) Take by mouth daily.     • ezetimibe (ZETIA) 10 mg tablet TAKE 1 TABLET BY MOUTH NIGHTLY 90 tablet 3   • famotidine (PEPCID) 40 mg tablet      • telmisartan (MICARDIS) 40 mg tablet TAKE 1 TABLET BY MOUTH DAILY 90 tablet 3   • topiramate 100 mg capsule,extended release 24hr Take 100 mg by mouth daily.       • butalbital-acetaminophen-caff (FIORICET, ESGIC) -40 mg per tablet      • ergocalciferol (VITAMIN D2) 50,000 unit capsule Take 1 capsule by mouth every 30 (thirty) days.       • EVENITY 210mg/2.34mL ( 105mg/1.17mLx2) syringe        No current facility-administered medications for this visit.       Review of Systems   Constitutional: Negative for diaphoresis, malaise/fatigue, weight gain and weight loss.   HENT: Positive for tinnitus. Negative for nosebleeds.    Eyes: Negative for blurred vision.   Cardiovascular: Negative for chest pain, claudication, dyspnea on exertion, leg swelling, orthopnea, palpitations and syncope.   Respiratory: Negative for cough and shortness of breath.    Hematologic/Lymphatic: Negative for adenopathy.   Skin: Negative for color change.   Musculoskeletal: Negative for muscle cramps.   Gastrointestinal: Negative for heartburn.   Genitourinary: Negative for nocturia.   Neurological: Negative for light-headedness.   Psychiatric/Behavioral: Negative for altered mental status. The patient has insomnia and is nervous/anxious.      Objective   Vitals:    12/01/23 1323   BP: 104/62   BP Location: Left upper arm   Patient Position: Sitting   Pulse: 71   Resp: 18   SpO2: 99%   Weight: 43.1 kg (95 lb)   Height: 1.676 m (5' 6\")     Weight: 43.1 kg (95 lb)     Physical Exam:    General Appearance:  Alert, no distress   Head:  Normocephalic, without obvious abnormality, atraumatic   Eyes:  Conjunctiva/corneas clear, EOM's intact   Neck: No thyroid enlargement. No JVD. " No bruits    Lungs:   Clear to auscultation bilaterally, respirations unlabored, no rales, no wheezing   Heart:  Regular rhythm, S1 and S2 normal, no murmur, rub or gallop   Abdomen:   Soft, non-tender, no masses, no organomegaly   Vascular: Pulses 2+ and symmetric all extremities, no carotid bruit or jugular vein distention   Musculoskeletal:  Skin: No injury or deformity  Skin color, texture, turgor normal, no rashes or lesions, no cyanosis or edema   Extremities: Extremities normal, atraumatic, pulses normal   Behavior/Emotional: Appropriate, cooperative       Lab Results   Component Value Date    WBC 4.90 05/07/2019    HGB 13.3 05/07/2019     05/07/2019    CHOL 201 (H) 11/30/2017    TRIG 115 11/30/2017    HDL 61 11/30/2017    LDLCALC 117 (H) 11/30/2017    ALT 35 05/07/2019    AST 41 05/07/2019     05/07/2019    K 4.2 05/07/2019    CREATININE 0.9 05/07/2019    TSH 1.23 07/26/2016          ECG   sinus rhythm  Within normal limits     ASSESSMENT/PLAN    Problem List Items Addressed This Visit        High    Essential hypertension - Primary    Current Assessment & Plan     Her blood pressure is excellent on telmisartan and her heart healthy lifestyle and diet.         Relevant Orders    ECG 12 LEAD-OFFICE PERFORMED (Completed)       Medium    Dyslipidemia    Overview     No FH of MI, cigs, dm    2009 cath: neg CAD    2016 Calcium score: zero    2023: 10 year risk for ASCVD: 11%    Bad reaction to statin in the past         Current Assessment & Plan     , , HDL 58,     I think is reasonable to follow her clinically on Zetia.  We talked about performing another coronary calcium score but elected not to.  She will continue her excellent diet.            Low    Palpitations    Current Assessment & Plan     Nothing that concerns her.             Return in about 1 year (around 12/1/2024).    Sukh Sandra MD  12/1/2023

## 2023-12-01 NOTE — ASSESSMENT & PLAN NOTE
, , HDL 58,     I think is reasonable to follow her clinically on Zetia.  We talked about performing another coronary calcium score but elected not to.  She will continue her excellent diet.

## 2023-12-01 NOTE — PROGRESS NOTES
Cardiology Note       Reason for visit:   Chief Complaint   Patient presents with   • Essential hypertension      HPI   Corrina Conteh is a 72 y.o. female who presents for scheduled follow-up.    I last saw her 1 year ago.  She has not had any significant health changes.  She takes care of her  who has early Alzheimer's.  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  She is active as she still participates in yoga and goes for walks.  She denies cardiac symptoms of chest pain, shortness breath, palpitations, lightheadedness or syncope.  She eats a very healthy diet and her weight is low and stable.  She believes her blood pressure has been fine.  She continues on Zetia.      History reviewed. No pertinent past medical history.  History reviewed. No pertinent surgical history.  Social History     Socioeconomic History   • Marital status:      Spouse name: None   • Number of children: None   • Years of education: None   • Highest education level: None   Tobacco Use   • Smoking status: Never   • Smokeless tobacco: Never   Vaping Use   • Vaping Use: Never used   Substance and Sexual Activity   • Alcohol use: No   • Drug use: No   • Sexual activity: Defer     History reviewed. No pertinent family history.  Hydromorphone, Nsaids (non-steroidal anti-inflammatory drug), Penicillins, and Sulfa (sulfonamide antibiotics)  Current Outpatient Medications   Medication Sig Dispense Refill   • alendronate (FOSAMAX) 70 mg tablet      • cholecalciferol, vitamin D3, (VITAMIN D3 ORAL) Take by mouth daily.     • ezetimibe (ZETIA) 10 mg tablet TAKE 1 TABLET BY MOUTH NIGHTLY 90 tablet 3   • famotidine (PEPCID) 40 mg tablet      • telmisartan (MICARDIS) 40 mg tablet TAKE 1 TABLET BY MOUTH DAILY 90 tablet 3   • topiramate 100 mg capsule,extended release 24hr Take 100 mg by mouth daily.       • butalbital-acetaminophen-caff (FIORICET, ESGIC) -40 mg per tablet      • ergocalciferol (VITAMIN D2) 50,000  "unit capsule Take 1 capsule by mouth every 30 (thirty) days.       • EVENITY 210mg/2.34mL ( 105mg/1.17mLx2) syringe        No current facility-administered medications for this visit.       Review of Systems   Constitutional: Negative for diaphoresis, malaise/fatigue, weight gain and weight loss.   HENT: Positive for tinnitus. Negative for nosebleeds.    Eyes: Negative for blurred vision.   Cardiovascular: Negative for chest pain, claudication, dyspnea on exertion, leg swelling, orthopnea, palpitations and syncope.   Respiratory: Negative for cough and shortness of breath.    Hematologic/Lymphatic: Negative for adenopathy.   Skin: Negative for color change.   Musculoskeletal: Negative for muscle cramps.   Gastrointestinal: Negative for heartburn.   Genitourinary: Negative for nocturia.   Neurological: Negative for light-headedness.   Psychiatric/Behavioral: Negative for altered mental status. The patient has insomnia and is nervous/anxious.      Objective   Vitals:    12/01/23 1323   BP: 104/62   BP Location: Left upper arm   Patient Position: Sitting   Pulse: 71   Resp: 18   SpO2: 99%   Weight: 43.1 kg (95 lb)   Height: 1.676 m (5' 6\")     Weight: 43.1 kg (95 lb)     Physical Exam:    General Appearance:  Alert, no distress   Head:  Normocephalic, without obvious abnormality, atraumatic   Eyes:  Conjunctiva/corneas clear, EOM's intact   Neck: No thyroid enlargement. No JVD. No bruits    Lungs:   Clear to auscultation bilaterally, respirations unlabored, no rales, no wheezing   Heart:  Regular rhythm, S1 and S2 normal, no murmur, rub or gallop   Abdomen:   Soft, non-tender, no masses, no organomegaly   Vascular: Pulses 2+ and symmetric all extremities, no carotid bruit or jugular vein distention   Musculoskeletal:  Skin: No injury or deformity  Skin color, texture, turgor normal, no rashes or lesions, no cyanosis or edema   Extremities: Extremities normal, atraumatic, pulses normal   Behavior/Emotional: Appropriate, " cooperative       Lab Results   Component Value Date    WBC 4.90 05/07/2019    HGB 13.3 05/07/2019     05/07/2019    CHOL 201 (H) 11/30/2017    TRIG 115 11/30/2017    HDL 61 11/30/2017    LDLCALC 117 (H) 11/30/2017    ALT 35 05/07/2019    AST 41 05/07/2019     05/07/2019    K 4.2 05/07/2019    CREATININE 0.9 05/07/2019    TSH 1.23 07/26/2016          ECG   sinus rhythm  Within normal limits     ASSESSMENT/PLAN    Problem List Items Addressed This Visit        High    Essential hypertension - Primary    Current Assessment & Plan     Her blood pressure is excellent on telmisartan and her heart healthy lifestyle and diet.         Relevant Orders    ECG 12 LEAD-OFFICE PERFORMED (Completed)       Medium    Dyslipidemia    Overview     No FH of MI, cigs, dm    2009 cath: neg CAD    2016 Calcium score: zero    2023: 10 year risk for ASCVD: 11%    Bad reaction to statin in the past         Current Assessment & Plan     , , HDL 58,     I think is reasonable to follow her clinically on Zetia.  We talked about performing another coronary calcium score but elected not to.  She will continue her excellent diet.            Low    Palpitations    Current Assessment & Plan     Nothing that concerns her.             Return in about 1 year (around 12/1/2024).    Sukh Sandra MD  12/1/2023

## 2023-12-15 ENCOUNTER — TRANSCRIBE ORDERS (OUTPATIENT)
Dept: SCHEDULING | Age: 73
End: 2023-12-15

## 2023-12-15 DIAGNOSIS — R92.8 OTHER ABNORMAL AND INCONCLUSIVE FINDINGS ON DIAGNOSTIC IMAGING OF BREAST: Primary | ICD-10-CM

## 2024-01-08 ENCOUNTER — TRANSCRIBE ORDERS (OUTPATIENT)
Dept: SCHEDULING | Age: 74
End: 2024-01-08

## 2024-01-08 DIAGNOSIS — M79.645 PAIN IN LEFT FINGER(S): Primary | ICD-10-CM

## 2024-01-08 DIAGNOSIS — M25.542 PAIN IN JOINTS OF LEFT HAND: ICD-10-CM

## 2024-01-11 ENCOUNTER — HOSPITAL ENCOUNTER (OUTPATIENT)
Dept: RADIOLOGY | Facility: HOSPITAL | Age: 74
Discharge: HOME | End: 2024-01-11
Attending: ORTHOPAEDIC SURGERY
Payer: COMMERCIAL

## 2024-01-11 DIAGNOSIS — M25.542 PAIN IN JOINTS OF LEFT HAND: ICD-10-CM

## 2024-01-11 DIAGNOSIS — M79.645 PAIN IN LEFT FINGER(S): ICD-10-CM

## 2024-02-21 ENCOUNTER — HOSPITAL ENCOUNTER (OUTPATIENT)
Dept: RADIOLOGY | Facility: HOSPITAL | Age: 74
Discharge: HOME | End: 2024-02-21
Attending: OBSTETRICS & GYNECOLOGY
Payer: COMMERCIAL

## 2024-02-21 DIAGNOSIS — R92.8 OTHER ABNORMAL AND INCONCLUSIVE FINDINGS ON DIAGNOSTIC IMAGING OF BREAST: ICD-10-CM

## 2024-02-21 PROCEDURE — G0279 TOMOSYNTHESIS, MAMMO: HCPCS

## 2024-02-21 PROCEDURE — 77066 DX MAMMO INCL CAD BI: CPT

## 2024-04-16 NOTE — DISCHARGE INSTRUCTIONS
[facility]  SURGERY OF THE HAND & UPPER EXTREMITY  MICROSURGERY    Sandor Lopez M.D.  [address] [facility]. [address] [address] [phone] FAX [phone]    POST-OPERATIVE INSTRUCTIONS  The following instructions are intended to answer some of your questions and help you through the initial post-operative period.  Please call our office at [phone] to make your first post-operative appointment.  Your hand should be kept elevated for the 24-48 hours after surgery to minimize swelling and discomfort.  Keep your dressing clean and dry.  The operative site is NOT permitted to get wet.  You will have your 1st dressing change at the time of your post-operative appointment.   Unless otherwise instructed, exercise your fingers by bending and straitening them as fully as you can.  This keep stiffness and swelling at a minimum.   If any medications have been prescribed, take them as directed.   A little blood staining on the dressing is NOT unusual.  If the staining progresses beyond the first 24-48 hours, please call the office.  Special Problems- When to call the office without delay.  Your fingers become increasingly cold, white or numb.  Your hand or fingers continue to swell beyond the 1st or 2nd days.  If your dressings or cast become increasingly tight and painful.  If you develop a fever higher than 101.5 F and you see reddish brown streaks extending up your arm  Remember you may call anytime you are having a true emergency.  However if you are having a continued problem, please do NOT wait until the end of the day or the weekend to call the office.  We can help you better when the office is open and your records are immediately available.  For the same reason, please hold your routine calls and questions for regular working hours. (Monday- Friday 9 am to 4:30 pm)    Thank You.

## 2024-04-17 ENCOUNTER — PRE-ADMISSION TESTING (OUTPATIENT)
Dept: PREADMISSION TESTING | Facility: HOSPITAL | Age: 74
End: 2024-04-17
Attending: ORTHOPAEDIC SURGERY
Payer: COMMERCIAL

## 2024-04-17 ENCOUNTER — TRANSCRIBE ORDERS (OUTPATIENT)
Dept: LAB | Facility: HOSPITAL | Age: 74
End: 2024-04-17

## 2024-04-17 ENCOUNTER — APPOINTMENT (OUTPATIENT)
Dept: LAB | Facility: HOSPITAL | Age: 74
End: 2024-04-17
Attending: ORTHOPAEDIC SURGERY
Payer: COMMERCIAL

## 2024-04-17 VITALS
DIASTOLIC BLOOD PRESSURE: 58 MMHG | WEIGHT: 100 LBS | OXYGEN SATURATION: 100 % | HEIGHT: 66 IN | BODY MASS INDEX: 16.07 KG/M2 | RESPIRATION RATE: 16 BRPM | SYSTOLIC BLOOD PRESSURE: 120 MMHG | HEART RATE: 84 BPM | TEMPERATURE: 96.9 F

## 2024-04-17 DIAGNOSIS — S63.642D SPRAIN OF METACARPOPHALANGEAL JOINT OF LEFT THUMB, SUBSEQUENT ENCOUNTER: ICD-10-CM

## 2024-04-17 DIAGNOSIS — M79.645 PAIN IN LEFT FINGER(S): ICD-10-CM

## 2024-04-17 DIAGNOSIS — S63.642D SPRAIN OF METACARPOPHALANGEAL JOINT OF LEFT THUMB, SUBSEQUENT ENCOUNTER: Primary | ICD-10-CM

## 2024-04-17 DIAGNOSIS — Z01.818 ENCOUNTER FOR PREADMISSION TESTING: Primary | ICD-10-CM

## 2024-04-17 LAB
ANION GAP SERPL CALC-SCNC: 9 MEQ/L (ref 3–15)
BUN SERPL-MCNC: 20 MG/DL (ref 7–25)
CALCIUM SERPL-MCNC: 9.1 MG/DL (ref 8.6–10.3)
CHLORIDE SERPL-SCNC: 108 MEQ/L (ref 98–107)
CO2 SERPL-SCNC: 23 MEQ/L (ref 21–31)
CREAT SERPL-MCNC: 0.9 MG/DL (ref 0.6–1.2)
EGFRCR SERPLBLD CKD-EPI 2021: >60 ML/MIN/1.73M*2
ERYTHROCYTE [DISTWIDTH] IN BLOOD BY AUTOMATED COUNT: 12.7 % (ref 11.7–14.4)
GLUCOSE SERPL-MCNC: 84 MG/DL (ref 70–99)
HCT VFR BLD AUTO: 38.7 % (ref 35–45)
HGB BLD-MCNC: 12.9 G/DL (ref 11.8–15.7)
MCH RBC QN AUTO: 31.6 PG (ref 28–33.2)
MCHC RBC AUTO-ENTMCNC: 33.3 G/DL (ref 32.2–35.5)
MCV RBC AUTO: 94.9 FL (ref 83–98)
PDW BLD AUTO: 11 FL (ref 9.4–12.3)
PLATELET # BLD AUTO: 197 K/UL (ref 150–369)
POTASSIUM SERPL-SCNC: 4.4 MEQ/L (ref 3.5–5.1)
RBC # BLD AUTO: 4.08 M/UL (ref 3.93–5.22)
SODIUM SERPL-SCNC: 140 MEQ/L (ref 136–145)
WBC # BLD AUTO: 4.47 K/UL (ref 3.8–10.5)

## 2024-04-17 PROCEDURE — 80048 BASIC METABOLIC PNL TOTAL CA: CPT

## 2024-04-17 PROCEDURE — 85027 COMPLETE CBC AUTOMATED: CPT

## 2024-04-17 PROCEDURE — 36415 COLL VENOUS BLD VENIPUNCTURE: CPT

## 2024-04-17 ASSESSMENT — ENCOUNTER SYMPTOMS
ALLERGIC/IMMUNOLOGIC NEGATIVE: 1
EYES NEGATIVE: 1
CARDIOVASCULAR NEGATIVE: 1
ENDOCRINE NEGATIVE: 1
NEUROLOGICAL NEGATIVE: 1
ACTIVITY CHANGE: 1
RESPIRATORY NEGATIVE: 1
PSYCHIATRIC NEGATIVE: 1
GASTROINTESTINAL NEGATIVE: 1
HEMATOLOGIC/LYMPHATIC NEGATIVE: 1
ARTHRALGIAS: 1

## 2024-04-17 ASSESSMENT — PAIN SCALES - GENERAL: PAINLEVEL: 7

## 2024-04-17 NOTE — PRE-PROCEDURE INSTRUCTIONS
1. We will call you between 3 pm and 7 pm on Tuesday April 30, 2024 to determine that arrival time for your procedure. If you do not hear by 6PM. Please call [phone] for arrival time.     2. Please report to Main Entrance near Parking lot [address], walk into main lobby and report to the admission desk on the first floor on the day of your procedure.    3. Please follow the following fasting guidelines:     No solid food after midnight on Tuesday 4/30/24.   You may have water up to 2 hours prior to arrival- minimize volume up to 6 oz.      4. Please take ONLY the following medications with a sip of water on the morning of your procedure:  No medications the morning of surgery     5. Other Instructions: You may brush your teeth the morning of the procedure. Rinse and spit, do not swallow.  Bring a list of your medications with dosages.  Use surgical wash as directed.     6. If you develop a cold, cough, fever, rash, or other symptom prior to the day of the procedure, please report it to your physician immediately.    7. If you need to cancel the procedure for any reason, please contact your physician.    8. Make arrangements to have safe transportation home accompanied by a responsible adult. If you have not arranged safe transportation home, your surgery will be cancelled. Safe transportation may include private vehicle, ride-share service, taxi and public transportation when accompanied by a responsible adult who will assist you home. A responsible adult is someone known to you and does not include the taxi, ride-share or public transit drive transporting you.    9. You may not take public transportation unless accompanied by a responsible person.    10. You may not drive a car or operate complex or potentially dangerous machinery for 24 hours following anesthesia and/or sedation.    11.  If it is medically necessary for you to have a longer stay, you will be informed as soon as the decision is made.    12. Only bring  essential items to the hospital.  Do not wear or bring anything of value to the hospital including jewelry of any kind, money, or wallet. Do not wear make-up or contact lenses.   DO NOT BRING MEDICATIONS FROM HOME unless instructed to do so. DO bring your hearing aids, glasses, and a case    13. No lotion, creams, powders, or oils on skin once you start the surgical wash or Dial soap.        14. Dress in comfortable clothes.    15.  If instructed, please bring a copy of your Advanced Directive (Living Will/Durable Power of ) on the day of your procedure.     16. Ensuring your safety at all times is a very important part of our [facility] Culture of Safety. After having surgery and sedation, you are at risk for falling and balance issues. Although you may feel awake, the effects of the medication can last up to 24 hours after anesthesia. If you need to use the bathroom during your recovery period, nursing staff will escort you there and stay with you to ensure your safety.    17. Refrain from drinking alcohol, Smoking cigarettes or marijuana for 24 hours prior to surgery.    18. Shower with antibacterial soap (Dial) the night before and morning of your procedure.  If your procedure indicates the need for CHG antiseptic wash (Bactoshield or Hibiclens), please use this instead and follow instructions as discussed at the time of your Pre-Admission Testing phone interview or visit.    Above instructions reviewed with patient and patient acknowledges understanding.       [facility]   Patient Education Preoperative Showers     Good Hygiene, such as frequent handwashing and daily skin cleansing, promotes good health.  Daily skin cleansing helps remove germs that may cause infections. The following instructions should be followed to help reduce germs on your skin prior to your surgical procedure.     Bactoshield/Hibiclens CHG 4% is an antiseptic soap.  The active ingredient is chlorhexidine gluconate. Do not use this  product if you are allergic to chlorhexidine gluconate.     The NIGHT before and the MORNING of your procedure , shower or bathe with Bactoshield. This product should replace your regular soap used for cleansing most of your body surfaces. Bactoshield should not be used on your head or face: keep out of your eyes, ears, and mouth.      If you plan to wash your hair, do so with your regular shampoo. Then, rinse the hair and your body thoroughly to move any shampoo residue.     Wash face with regular soap and water only.     Wash your genital area with soap and water only.    Thoroughly rinse your body with warm water from the neck down.       Apply the minimum amount of Bactoshield to cover the skin. Use this product as you would any liquid soap. Leave this on for 2 minutes. Note- Bactoshield DOES NOT LATHER like normal soap.      Wash the skin gently and rinse thoroughly with warm water. You do not need to scrub the skin to remove germs.      Do not use regular soap after you have applied and rinsed the Bactoshield.  Change into clean clothes after each shower.     Do not apply any lotion, powder, or perfumes to the body areas that have been cleansed with Bactoshield.     No use of hair removal products or shaving at or near the surgical site 48 hours before your procedure. (72 hours for cardiac patients.)    For those having perineal area surgeries (ie: vaginal, rectal or cystoscopy) - please use Dial soap.          Why do we cleanse the skin prior to surgery?   There are lots of germs on our skin and in our environment. Most are harmless, some are even helpful on our skin and in our environment. As part of your preparation you will be asked to purchase a bottle of antibacterial soap, Bactoshield CHG 4% or Hibiclens CHG4% to cleanse your skin and eliminate a certain bacteria called, Staphylococcus Aureus.      What is Staphylococcus aureus? (Staph)   Staph is a common germ found on the skin. One-third of healthy  people carry this germ. Methicillin-resistant Staphylococcus aureus (MRSA) is a type of Staph bacteria that is resistant to certain antibiotics. In the community, most MRSA infections are skin infections.  People who have Staph/MRSA may show no signs of illness- this is called colonization. Caution needs to be used when you come into the hospital for a surgical procedure to ensure that Staph does not enter your body.     Am I at increased risk for infection if I am carrying Staph ?   Because Staph is carried on your skin, you may be at increased risk for developing a surgical infection. To reduce the presence of Staph on your skin, we recommend a series of preoperative showers with an antibacterial skin cleanser. These showers are to be done the NIGHT BEFORE your surgery and the MORNING of your surgery.

## 2024-04-17 NOTE — H&P (VIEW-ONLY)
History and Physical  Pre-admission testing         HISTORY OF PRESENT ILLNESS      Corrina Conteh is a left handed 73 y.o. female with a past medical history of OA, HTN, IBS, HLD and migraines who has had left thumb pain for the past year.  Patient has limited ROM and is wearing a splint.  Referred to Dr Lopez for surgical evaluation who recommends Arthrodesis MP joint left thumb  for 5/1/24.     PAST MEDICAL AND SURGICAL HISTORY      Past Medical History:   Diagnosis Date    Arthritis     multi-joint    Hypertension     IBS (irritable bowel syndrome)     Lipid disorder     Migraines     Osteopenia        Past Surgical History:   Procedure Laterality Date    KNEE ARTHROSCOPY  2004    OOPHORECTOMY Left 1984    and tubal ligation    OTHER SURGICAL HISTORY      1990's - scar tissue excision in abdomen -    TONSILLECTOMY  1954       PROBLEM LIST     Patient Active Problem List   Diagnosis    Essential hypertension    Dyslipidemia    Palpitations       MEDICATIONS        Current Outpatient Medications:     alendronate (FOSAMAX) 70 mg tablet, Take 70 mg by mouth once a week on Thursday., Disp: , Rfl:     butalbital-acetaminophen-caff (FIORICET, ESGIC) -40 mg per tablet, Take 1 tablet by mouth as needed., Disp: , Rfl:     cholecalciferol, vitamin D3, (VITAMIN D3 ORAL), Take by mouth daily., Disp: , Rfl:     ezetimibe (ZETIA) 10 mg tablet, TAKE 1 TABLET BY MOUTH NIGHTLY, Disp: 90 tablet, Rfl: 3    telmisartan (MICARDIS) 40 mg tablet, TAKE 1 TABLET BY MOUTH DAILY (Patient taking differently: Take 40 mg by mouth nightly.), Disp: 90 tablet, Rfl: 3    topiramate 100 mg capsule,extended release 24hr, Take 100 mg by mouth nightly., Disp: , Rfl:     ALLERGIES      Allergies   Allergen Reactions    Hydromorphone      Post op loss of hearing    Aspirin     Nsaids (Non-Steroidal Anti-Inflammatory Drug) GI intolerance    Penicillins      rash    Sulfa (Sulfonamide Antibiotics)      rash       FAMILY HISTORY      No family  "history on file.    SOCIAL HISTORY      Social History     Socioeconomic History    Marital status:      Spouse name: Not on file    Number of children: Not on file    Years of education: Not on file    Highest education level: Not on file   Occupational History    Not on file   Tobacco Use    Smoking status: Never    Smokeless tobacco: Never   Vaping Use    Vaping Use: Never used   Substance and Sexual Activity    Alcohol use: No    Drug use: No    Sexual activity: Defer   Other Topics Concern    Not on file   Social History Narrative    Not on file     Social Determinants of Health     Financial Resource Strain: Not on file   Food Insecurity: Not on file   Transportation Needs: Not on file   Physical Activity: Not on file   Stress: Not on file   Social Connections: Not on file   Intimate Partner Violence: Not on file   Housing Stability: Not on file       REVIEW OF SYSTEMS      Review of Systems   Constitutional:  Positive for activity change.   HENT: Negative.     Eyes: Negative.    Respiratory: Negative.     Cardiovascular: Negative.    Gastrointestinal: Negative.    Endocrine: Negative.    Genitourinary: Negative.    Musculoskeletal:  Positive for arthralgias.   Skin: Negative.    Allergic/Immunologic: Negative.    Neurological: Negative.    Hematological: Negative.    Psychiatric/Behavioral: Negative.         PHYSICAL EXAMINATION      Visit Vitals  BP (!) 120/58 (BP Location: Left upper arm, Patient Position: Sitting)   Pulse 84   Temp (!) 36.1 °C (96.9 °F) (Temporal)   Resp 16   Ht 1.676 m (5' 6\")   Wt 45.4 kg (100 lb)   SpO2 100%   BMI 16.14 kg/m²     Body mass index is 16.14 kg/m².    Physical Exam  Vitals reviewed.   Constitutional:       Appearance: Normal appearance.   HENT:      Head: Normocephalic.      Nose: Nose normal.      Mouth/Throat:      Mouth: Mucous membranes are moist.   Eyes:      Pupils: Pupils are equal, round, and reactive to light.   Cardiovascular:      Rate and Rhythm: Normal " rate and regular rhythm.      Heart sounds: Normal heart sounds.   Pulmonary:      Effort: Pulmonary effort is normal.      Breath sounds: Normal breath sounds.   Abdominal:      General: Bowel sounds are normal.      Palpations: Abdomen is soft.   Musculoskeletal:         General: No swelling.      Cervical back: Normal range of motion.      Comments: Left thumb limited ROM in splint    Skin:     General: Skin is warm and dry.   Neurological:      General: No focal deficit present.      Mental Status: She is alert and oriented to person, place, and time.   Psychiatric:         Mood and Affect: Mood normal.         Behavior: Behavior normal.            JUSTIN Ricketts  4/17/2024

## 2024-04-30 ENCOUNTER — ANESTHESIA EVENT (OUTPATIENT)
Dept: SURGERY | Facility: HOSPITAL | Age: 74
Setting detail: HOSPITAL OUTPATIENT SURGERY
End: 2024-04-30
Payer: COMMERCIAL

## 2024-04-30 NOTE — ANESTHESIA PREPROCEDURE EVALUATION
Relevant Problems   CARDIOVASCULAR   (+) Dyslipidemia   (+) Essential hypertension       Anesthesia ROS/MED HX      Neuro/Psych    Anxiety  Cardiovascular   dyslipidemia   hypertension  Dysrhythmias: PALPITATIONS.  Endo/Other  Body Habitus: Normal       Past Surgical History:   Procedure Laterality Date   • KNEE ARTHROSCOPY  2004   • OOPHORECTOMY Left 1984    and tubal ligation   • OTHER SURGICAL HISTORY      1990's - scar tissue excision in abdomen -   • TONSILLECTOMY  1954       Physical Exam    Airway   Mallampati: II   TM distance: >3 FB   Neck ROM: full  Cardiovascular - normal   Rhythm: regular   Rate: normalPulmonary - normal   clear to auscultation  Dental - normal        Anesthesia Plan    Plan: general    Technique: general LMA   2 ASA  Anesthetic plan and risks discussed with: patient  Comments:    Plan: CONFIRM ANESTHES TYPE

## 2024-05-01 ENCOUNTER — ANESTHESIA (OUTPATIENT)
Dept: SURGERY | Facility: HOSPITAL | Age: 74
Setting detail: HOSPITAL OUTPATIENT SURGERY
End: 2024-05-01
Payer: COMMERCIAL

## 2024-05-01 ENCOUNTER — HOSPITAL ENCOUNTER (OUTPATIENT)
Facility: HOSPITAL | Age: 74
Setting detail: HOSPITAL OUTPATIENT SURGERY
Discharge: HOME | End: 2024-05-01
Attending: ORTHOPAEDIC SURGERY | Admitting: ORTHOPAEDIC SURGERY
Payer: COMMERCIAL

## 2024-05-01 VITALS
HEART RATE: 84 BPM | RESPIRATION RATE: 16 BRPM | DIASTOLIC BLOOD PRESSURE: 74 MMHG | TEMPERATURE: 97.5 F | SYSTOLIC BLOOD PRESSURE: 154 MMHG | OXYGEN SATURATION: 96 %

## 2024-05-01 DIAGNOSIS — M19.042 DEGENERATIVE ARTHRITIS OF METACARPOPHALANGEAL JOINT OF LEFT THUMB: Primary | ICD-10-CM

## 2024-05-01 PROCEDURE — 0RGV04Z FUSION OF LEFT METACARPOPHALANGEAL JOINT WITH INTERNAL FIXATION DEVICE, OPEN APPROACH: ICD-10-PCS | Performed by: ORTHOPAEDIC SURGERY

## 2024-05-01 PROCEDURE — 71000011 HC PACU PHASE 1 EA ADDL MIN: Performed by: ORTHOPAEDIC SURGERY

## 2024-05-01 PROCEDURE — 25800000 HC PHARMACY IV SOLUTIONS: Performed by: PHYSICIAN ASSISTANT

## 2024-05-01 PROCEDURE — 63600000 HC DRUGS/DETAIL CODE: Mod: JZ | Performed by: PHYSICIAN ASSISTANT

## 2024-05-01 PROCEDURE — 63600000 HC DRUGS/DETAIL CODE: Mod: JW,JG | Performed by: ORTHOPAEDIC SURGERY

## 2024-05-01 PROCEDURE — 63600000 HC DRUGS/DETAIL CODE: Performed by: STUDENT IN AN ORGANIZED HEALTH CARE EDUCATION/TRAINING PROGRAM

## 2024-05-01 PROCEDURE — 25000000 HC PHARMACY GENERAL: Performed by: STUDENT IN AN ORGANIZED HEALTH CARE EDUCATION/TRAINING PROGRAM

## 2024-05-01 PROCEDURE — 36000013 HC OR LEVEL 3 EA ADDL MIN: Performed by: ORTHOPAEDIC SURGERY

## 2024-05-01 PROCEDURE — C1769 GUIDE WIRE: HCPCS | Performed by: ORTHOPAEDIC SURGERY

## 2024-05-01 PROCEDURE — 71000012 HC PACU PHASE 2 EA ADDL MIN: Performed by: ORTHOPAEDIC SURGERY

## 2024-05-01 PROCEDURE — 25000000 HC PHARMACY GENERAL: Performed by: ORTHOPAEDIC SURGERY

## 2024-05-01 PROCEDURE — 71000002 HC PACU PHASE 2 INITIAL 30MIN: Performed by: ORTHOPAEDIC SURGERY

## 2024-05-01 PROCEDURE — 71000001 HC PACU PHASE 1 INITIAL 30MIN: Performed by: ORTHOPAEDIC SURGERY

## 2024-05-01 PROCEDURE — C1713 ANCHOR/SCREW BN/BN,TIS/BN: HCPCS | Performed by: ORTHOPAEDIC SURGERY

## 2024-05-01 PROCEDURE — 37000001 HC ANESTHESIA GENERAL: Performed by: ORTHOPAEDIC SURGERY

## 2024-05-01 PROCEDURE — 36000003 HC OR LEVEL 3 INITIAL 30MIN: Performed by: ORTHOPAEDIC SURGERY

## 2024-05-01 PROCEDURE — 63700000 HC SELF-ADMINISTRABLE DRUG: Performed by: ORTHOPAEDIC SURGERY

## 2024-05-01 PROCEDURE — 27200000 HC STERILE SUPPLY: Performed by: ORTHOPAEDIC SURGERY

## 2024-05-01 RX ORDER — LABETALOL HCL 20 MG/4 ML
5 SYRINGE (ML) INTRAVENOUS AS NEEDED
Status: DISCONTINUED | OUTPATIENT
Start: 2024-05-01 | End: 2024-05-01 | Stop reason: HOSPADM

## 2024-05-01 RX ORDER — SODIUM CHLORIDE 9 MG/ML
INJECTION, SOLUTION INTRAVENOUS CONTINUOUS
Status: DISCONTINUED | OUTPATIENT
Start: 2024-05-01 | End: 2024-05-01 | Stop reason: HOSPADM

## 2024-05-01 RX ORDER — DEXTROSE 40 %
15-30 GEL (GRAM) ORAL AS NEEDED
Status: DISCONTINUED | OUTPATIENT
Start: 2024-05-01 | End: 2024-05-01 | Stop reason: HOSPADM

## 2024-05-01 RX ORDER — DEXAMETHASONE SODIUM PHOSPHATE 4 MG/ML
INJECTION, SOLUTION INTRA-ARTICULAR; INTRALESIONAL; INTRAMUSCULAR; INTRAVENOUS; SOFT TISSUE AS NEEDED
Status: DISCONTINUED | OUTPATIENT
Start: 2024-05-01 | End: 2024-05-01 | Stop reason: SURG

## 2024-05-01 RX ORDER — ONDANSETRON HYDROCHLORIDE 2 MG/ML
INJECTION, SOLUTION INTRAVENOUS AS NEEDED
Status: DISCONTINUED | OUTPATIENT
Start: 2024-05-01 | End: 2024-05-01 | Stop reason: SURG

## 2024-05-01 RX ORDER — PROPOFOL 10 MG/ML
INJECTION, EMULSION INTRAVENOUS AS NEEDED
Status: DISCONTINUED | OUTPATIENT
Start: 2024-05-01 | End: 2024-05-01 | Stop reason: SURG

## 2024-05-01 RX ORDER — LIDOCAINE HYDROCHLORIDE 10 MG/ML
INJECTION, SOLUTION INFILTRATION; PERINEURAL
Status: DISCONTINUED | OUTPATIENT
Start: 2024-05-01 | End: 2024-05-01 | Stop reason: HOSPADM

## 2024-05-01 RX ORDER — IBUPROFEN 200 MG
16-32 TABLET ORAL AS NEEDED
Status: DISCONTINUED | OUTPATIENT
Start: 2024-05-01 | End: 2024-05-01 | Stop reason: HOSPADM

## 2024-05-01 RX ORDER — ONDANSETRON HYDROCHLORIDE 2 MG/ML
4 INJECTION, SOLUTION INTRAVENOUS EVERY 8 HOURS PRN
Status: DISCONTINUED | OUTPATIENT
Start: 2024-05-01 | End: 2024-05-01 | Stop reason: HOSPADM

## 2024-05-01 RX ORDER — DEXTROSE 50 % IN WATER (D50W) INTRAVENOUS SYRINGE
25 AS NEEDED
Status: DISCONTINUED | OUTPATIENT
Start: 2024-05-01 | End: 2024-05-01 | Stop reason: HOSPADM

## 2024-05-01 RX ORDER — OXYCODONE AND ACETAMINOPHEN 5; 325 MG/1; MG/1
1 TABLET ORAL EVERY 4 HOURS PRN
Status: COMPLETED | OUTPATIENT
Start: 2024-05-01 | End: 2024-05-01

## 2024-05-01 RX ORDER — ONDANSETRON HYDROCHLORIDE 2 MG/ML
4 INJECTION, SOLUTION INTRAVENOUS
Status: DISCONTINUED | OUTPATIENT
Start: 2024-05-01 | End: 2024-05-01 | Stop reason: HOSPADM

## 2024-05-01 RX ORDER — FENTANYL CITRATE 50 UG/ML
50 INJECTION, SOLUTION INTRAMUSCULAR; INTRAVENOUS EVERY 5 MIN PRN
Status: DISCONTINUED | OUTPATIENT
Start: 2024-05-01 | End: 2024-05-01 | Stop reason: HOSPADM

## 2024-05-01 RX ORDER — BUPIVACAINE HYDROCHLORIDE 5 MG/ML
INJECTION, SOLUTION EPIDURAL; INTRACAUDAL
Status: DISCONTINUED | OUTPATIENT
Start: 2024-05-01 | End: 2024-05-01 | Stop reason: HOSPADM

## 2024-05-01 RX ORDER — FENTANYL CITRATE 50 UG/ML
INJECTION, SOLUTION INTRAMUSCULAR; INTRAVENOUS AS NEEDED
Status: DISCONTINUED | OUTPATIENT
Start: 2024-05-01 | End: 2024-05-01 | Stop reason: SURG

## 2024-05-01 RX ORDER — HYDRALAZINE HYDROCHLORIDE 20 MG/ML
5 INJECTION INTRAMUSCULAR; INTRAVENOUS
Status: DISCONTINUED | OUTPATIENT
Start: 2024-05-01 | End: 2024-05-01 | Stop reason: HOSPADM

## 2024-05-01 RX ORDER — OXYCODONE AND ACETAMINOPHEN 5; 325 MG/1; MG/1
1 TABLET ORAL EVERY 4 HOURS PRN
Qty: 30 TABLET | Refills: 0 | Status: SHIPPED | OUTPATIENT
Start: 2024-05-01 | End: 2024-05-06

## 2024-05-01 RX ORDER — MIDAZOLAM HYDROCHLORIDE 2 MG/2ML
INJECTION, SOLUTION INTRAMUSCULAR; INTRAVENOUS AS NEEDED
Status: DISCONTINUED | OUTPATIENT
Start: 2024-05-01 | End: 2024-05-01 | Stop reason: SURG

## 2024-05-01 RX ORDER — EPHEDRINE SULFATE/0.9% NACL/PF 50 MG/5 ML
SYRINGE (ML) INTRAVENOUS AS NEEDED
Status: DISCONTINUED | OUTPATIENT
Start: 2024-05-01 | End: 2024-05-01 | Stop reason: SURG

## 2024-05-01 RX ORDER — LIDOCAINE HYDROCHLORIDE 10 MG/ML
INJECTION, SOLUTION EPIDURAL; INFILTRATION; INTRACAUDAL; PERINEURAL AS NEEDED
Status: DISCONTINUED | OUTPATIENT
Start: 2024-05-01 | End: 2024-05-01 | Stop reason: SURG

## 2024-05-01 RX ADMIN — FENTANYL CITRATE 50 MCG: 50 INJECTION, SOLUTION INTRAMUSCULAR; INTRAVENOUS at 09:08

## 2024-05-01 RX ADMIN — ONDANSETRON HYDROCHLORIDE 4 MG: 2 SOLUTION INTRAMUSCULAR; INTRAVENOUS at 09:39

## 2024-05-01 RX ADMIN — FENTANYL CITRATE 25 MCG: 50 INJECTION, SOLUTION INTRAMUSCULAR; INTRAVENOUS at 09:02

## 2024-05-01 RX ADMIN — Medication 5 MG: at 08:50

## 2024-05-01 RX ADMIN — FENTANYL CITRATE 50 MCG: 50 INJECTION, SOLUTION INTRAMUSCULAR; INTRAVENOUS at 09:38

## 2024-05-01 RX ADMIN — FENTANYL CITRATE 50 MCG: 50 INJECTION, SOLUTION INTRAMUSCULAR; INTRAVENOUS at 08:30

## 2024-05-01 RX ADMIN — CEFAZOLIN 2 G: 2 INJECTION, POWDER, FOR SOLUTION INTRAMUSCULAR; INTRAVENOUS at 08:40

## 2024-05-01 RX ADMIN — Medication 5 MG: at 08:44

## 2024-05-01 RX ADMIN — MIDAZOLAM HYDROCHLORIDE 2 MG: 1 INJECTION, SOLUTION INTRAMUSCULAR; INTRAVENOUS at 08:30

## 2024-05-01 RX ADMIN — Medication 5 MG: at 08:55

## 2024-05-01 RX ADMIN — LIDOCAINE HYDROCHLORIDE 5 ML: 10 INJECTION, SOLUTION EPIDURAL; INFILTRATION; INTRACAUDAL; PERINEURAL at 08:31

## 2024-05-01 RX ADMIN — FENTANYL CITRATE 25 MCG: 50 INJECTION, SOLUTION INTRAMUSCULAR; INTRAVENOUS at 08:54

## 2024-05-01 RX ADMIN — PROPOFOL 150 MG: 10 INJECTION, EMULSION INTRAVENOUS at 08:34

## 2024-05-01 RX ADMIN — SODIUM CHLORIDE: 9 INJECTION, SOLUTION INTRAVENOUS at 08:25

## 2024-05-01 RX ADMIN — OXYCODONE HYDROCHLORIDE AND ACETAMINOPHEN 1 TABLET: 5; 325 TABLET ORAL at 11:15

## 2024-05-01 RX ADMIN — SODIUM CHLORIDE: 9 INJECTION, SOLUTION INTRAVENOUS at 08:08

## 2024-05-01 RX ADMIN — DEXAMETHASONE SODIUM PHOSPHATE 8 MG: 4 INJECTION INTRA-ARTICULAR; INTRALESIONAL; INTRAMUSCULAR; INTRAVENOUS; SOFT TISSUE at 08:45

## 2024-05-01 NOTE — ANESTHESIOLOGIST PRE-PROCEDURE ATTESTATION
Pre-Procedure Patient Identification:  I am the Primary Anesthesiologist and have identified the patient on 05/01/24 at 8:06 AM.   I have confirmed the procedure(s) will be performed by the following surgeon/proceduralist Sandor Lopez MD.

## 2024-05-01 NOTE — ANESTHESIA POSTPROCEDURE EVALUATION
Patient: Corrina Conteh    Procedure Summary     Date: 05/01/24 Room / Location: [facility]    Anesthesia Start: 0830 Anesthesia Stop: 1025    Procedure: Arthrodesis MP joint left thumb (Left) Diagnosis:       Pain in finger of left hand      Sprain of metacarpophalangeal (MCP) joint of left thumb, subsequent encounter      (Pain left thumb , sprain left mp Joint m79.645, s63.642D)    Surgeons: Sandor Lopez MD Responsible Provider: Khai Cotter MD    Anesthesia Type: general ASA Status: 2          Anesthesia Type: general  PACU Vitals  5/1/2024 1018 - 5/1/2024 1118      5/1/2024  1020 5/1/2024  1030 5/1/2024  1045       BP: 135/70 120/65 121/67     Temp: 36.8 °C (98.3 °F) -- --     Pulse: 117 107 99     Resp: 9 12 11     SpO2: 96 % 95 % 98 %             Anesthesia Post Evaluation    Pain management: adequate  Patient participation: complete - patient participated  Level of consciousness: awake and alert  Cardiovascular status: acceptable  Airway Patency: adequate  Respiratory status: acceptable  Hydration status: acceptable  Anesthetic complications: no

## 2024-05-01 NOTE — OR SURGEON
Pre-Procedure patient identification:  I am the primary operating surgeon/proceduralist and I have reviewed the applicable pathology reports and radiology studies for this procedure. I have identified the patient on 05/01/24 at 7:21 AM Sandor Lopez MD  Phone Number: [phone]

## 2024-05-01 NOTE — ANESTHESIA PROCEDURE NOTES
Airway  Urgency: elective    Start Time: 5/1/2024 8:38 AM  Airway not difficult    General Information and Staff    Patient location during procedure: OR  Anesthesiologist: Khai Cotter MD  Performed: anesthesiologist   Performed by: Khai Cotter MD  Authorized by: Khai Cotter MD      Indications and Patient Condition  Indications for airway management: anesthesia  Sedation level: deep  Preoxygenated: yes  Mask difficulty assessment: 1 - vent by mask    Final Airway Details  Final airway type: supraglottic airway      Successful airway: iGel  Size 3     Number of attempts at approach: 1

## 2024-05-01 NOTE — OP NOTE
REPORT TYPE: Operative Note    DATE OF OPERATION: 05/01/2024    PREOPERATIVE DIAGNOSIS:  Arthritis, metatarsophalangeal joint left thumb.    POSTOPERATIVE DIAGNOSIS:  Arthritis, metacarpophalangeal joint left thumb.    PROCEDURE:  Arthrodesis, MP joint, left thumb.    ATTENDING SURGEON:  Sandor Lopez MD    ASSISTANT:  Shanna Trent PA-C    ANESTHESIA:  General.    DESCRIPTION OF PROCEDURE:  The patient was brought to the operating room and placed on the table in supine position.  General anesthesia was administered.  Left arm was prepped and draped in standard fashion.  After a proper timeout and with the   tourniquet inflated, a gentle curved incision was developed over the dorsal aspect of the left thumb MP joint.  Skin flaps elevated.  Care taken to identify and protect small crossing cutaneous nerve branches.  The radial sagittal fibers in the MP joint   extensor mechanism were divided and the extensor tendon was retracted ulnarward.  The capsule of the MP joint was identified.  This was opened longitudinally, exposing the MP joint.  Collateral ligaments were released and the joint opened.  Inspection of   the joint showed the articular surfaces of the head of the metacarpal and base of the proximal phalanx, this showed evidence of cartilage loss and exposed subchondral bone consistent with arthritis. Using the special reamers, the head of the metacarpal   and base of the proximal phalanx were prepared down to bleeding cancellous bone.  Trial fittings were done with a small rongeur, which was used to trim some overhanging bone to ensure a good fit.  The joint was then stabilized into about 10 or 15 degrees   of flexion using 2 longitudinal 0.045 K-wires driven from the neck of the metacarpal into the shaft of the proximal phalanx.  The image intensifier was used to confirm and adjust the position of the wires. A 0.062 wire was then used to drill a channel   about an inch from the base of the proximal  phalanx transversely and through this a 22-gauge stainless steel wire was fed, this was then brought over the dorsum of the MP joint, crisscrossed and looped around the base of the K-wires.  By applying   compression and tightening of the wires, a compressive figure-of-eight construct was completed.  The excess length of the K-wires and the 22 wire were clipped and bent over to prevent any movement.  Final imaging with the mini image showed good position   of the hardware and good position of the fusion.  Wounds were irrigated.  Capsule was closed over the hardware using 4-0 PDS sutures.  The radial leaf of the sagittal bands was repaired with 4-0 PDS and the skin closed with a 4-0 chromic and the surface   sealed with Dermabond.  A metacarpal block was applied to the left thumb using 1% Xylocaine and 0.5% Marcaine.  Dry sterile light fluff compressive dressing was applied as well as protective thumb spica plaster splint.  Tourniquet released with excellent   circulation reappearing in the hand and fingers.  The patient was awakened and transported out of the operating room in satisfactory condition.      Sandor Lopez MD    DD: 05/01/2024 10:45  DT: 05/01/2024 14:58  Voice ID: [id number]/Report ID: [id number]  ana

## 2024-10-01 RX ORDER — EZETIMIBE 10 MG/1
10 TABLET ORAL NIGHTLY
Qty: 90 TABLET | Refills: 3 | Status: SHIPPED | OUTPATIENT
Start: 2024-10-01 | End: 2024-12-02 | Stop reason: SDUPTHER

## 2024-10-01 RX ORDER — TELMISARTAN 40 MG/1
40 TABLET ORAL DAILY
Qty: 90 TABLET | Refills: 3 | Status: SHIPPED | OUTPATIENT
Start: 2024-10-01 | End: 2024-12-02 | Stop reason: SDUPTHER

## 2024-10-01 NOTE — TELEPHONE ENCOUNTER
"Medicine Refill Request  Pt called requests a refill of         telmisartan (MICARDIS) 40 mg tablet     ezetimibe (ZETIA) 10 mg tablet               Last Office Visit: Visit date not found   Last Consult Visit: Visit date not found  Last Telemedicine Visit: Visit date not found    Next Appointment: Visit date not found      Current Outpatient Medications:     alendronate (FOSAMAX) 70 mg tablet, Take 70 mg by mouth once a week on Thursday., Disp: , Rfl:     cholecalciferol, vitamin D3, (VITAMIN D3 ORAL), Take by mouth daily., Disp: , Rfl:     ezetimibe (ZETIA) 10 mg tablet, TAKE 1 TABLET BY MOUTH NIGHTLY, Disp: 90 tablet, Rfl: 3    telmisartan (MICARDIS) 40 mg tablet, TAKE 1 TABLET BY MOUTH DAILY (Patient taking differently: Take 40 mg by mouth nightly.), Disp: 90 tablet, Rfl: 3    topiramate 100 mg capsule,extended release 24hr, Take 100 mg by mouth nightly., Disp: , Rfl:       BP Readings from Last 3 Encounters:   05/01/24 (!) 154/74   04/17/24 (!) 120/58   12/01/23 104/62       Recent Lab results:  Lab Results   Component Value Date    CHOL 201 (H) 11/30/2017   ,   Lab Results   Component Value Date    HDL 61 11/30/2017   ,   Lab Results   Component Value Date    LDLCALC 117 (H) 11/30/2017   ,   Lab Results   Component Value Date    TRIG 115 11/30/2017        Lab Results   Component Value Date    GLUCOSE 84 04/17/2024   , No results found for: \"HGBA1C\"      Lab Results   Component Value Date    CREATININE 0.9 04/17/2024       Lab Results   Component Value Date    TSH 1.23 07/26/2016         No results found for: \"HGBA1C\"  "

## 2024-12-02 ENCOUNTER — OFFICE VISIT (OUTPATIENT)
Dept: CARDIOLOGY | Facility: CLINIC | Age: 74
End: 2024-12-02
Payer: COMMERCIAL

## 2024-12-02 VITALS
HEART RATE: 79 BPM | OXYGEN SATURATION: 98 % | HEIGHT: 66 IN | DIASTOLIC BLOOD PRESSURE: 68 MMHG | BODY MASS INDEX: 14.94 KG/M2 | WEIGHT: 93 LBS | SYSTOLIC BLOOD PRESSURE: 110 MMHG | RESPIRATION RATE: 16 BRPM

## 2024-12-02 DIAGNOSIS — E78.5 DYSLIPIDEMIA: ICD-10-CM

## 2024-12-02 DIAGNOSIS — K22.89 PRESBYESOPHAGUS: ICD-10-CM

## 2024-12-02 DIAGNOSIS — R00.2 PALPITATIONS: ICD-10-CM

## 2024-12-02 DIAGNOSIS — I10 ESSENTIAL HYPERTENSION: Primary | ICD-10-CM

## 2024-12-02 PROBLEM — K22.0 ESOPHAGEAL ACHALASIA: Status: ACTIVE | Noted: 2024-12-02

## 2024-12-02 LAB
ATRIAL RATE: 79
P AXIS: 74
PR INTERVAL: 132
QRS DURATION: 76
QT INTERVAL: 368
QTC CALCULATION(BAZETT): 421
R AXIS: 56
T WAVE AXIS: 73
VENTRICULAR RATE: 79

## 2024-12-02 PROCEDURE — 3008F BODY MASS INDEX DOCD: CPT | Performed by: INTERNAL MEDICINE

## 2024-12-02 PROCEDURE — 93000 ELECTROCARDIOGRAM COMPLETE: CPT | Performed by: INTERNAL MEDICINE

## 2024-12-02 PROCEDURE — 3078F DIAST BP <80 MM HG: CPT | Performed by: INTERNAL MEDICINE

## 2024-12-02 PROCEDURE — 99214 OFFICE O/P EST MOD 30 MIN: CPT | Performed by: INTERNAL MEDICINE

## 2024-12-02 PROCEDURE — 3074F SYST BP LT 130 MM HG: CPT | Performed by: INTERNAL MEDICINE

## 2024-12-02 RX ORDER — FAMOTIDINE 20 MG/1
20 TABLET, FILM COATED ORAL 2 TIMES DAILY
COMMUNITY

## 2024-12-02 RX ORDER — TELMISARTAN 40 MG/1
40 TABLET ORAL DAILY
Qty: 90 TABLET | Refills: 3 | Status: SHIPPED | OUTPATIENT
Start: 2024-12-02

## 2024-12-02 RX ORDER — EZETIMIBE 10 MG/1
10 TABLET ORAL NIGHTLY
Qty: 90 TABLET | Refills: 3 | Status: SHIPPED | OUTPATIENT
Start: 2024-12-02

## 2024-12-02 ASSESSMENT — ENCOUNTER SYMPTOMS
ADENOPATHY: 0
MUSCLE CRAMPS: 0
HEARTBURN: 1
HOARSE VOICE: 1
WEIGHT LOSS: 1
LIGHT-HEADEDNESS: 0
COLOR CHANGE: 0
ORTHOPNEA: 0
SYNCOPE: 0
INSOMNIA: 1
SHORTNESS OF BREATH: 1
NECK PAIN: 1
COUGH: 0
PALPITATIONS: 0
ALTERED MENTAL STATUS: 0
DIARRHEA: 1
DYSPNEA ON EXERTION: 1
DIAPHORESIS: 0
WEIGHT GAIN: 0
BLURRED VISION: 0
CLAUDICATION: 0

## 2024-12-02 NOTE — PROGRESS NOTES
Cardiology Note       Reason for visit:   Chief Complaint   Patient presents with    Essential hypertension      HPI   Corrina Conteh is a 73 y.o. female who presents for scheduled follow-up.    Last saw her 1 year ago.  Unfortunately she tells me she has been very ill for the last several weeks or so.  She is a pain in her epigastrium trouble swallowing and has lost weight and has become more weak.  She is on a new medication which is now helping her which she is very pleased about.  Yesterday she was able to walk for 2.5 miles with her daughter.  She otherwise has been inactive.  She still has had some loose stools at times which is more of a chronic problem.  From a cardiac perspective, she denies chest pain, shortness of breath, palpitations, lightheadedness or syncope.      Past Medical History:   Diagnosis Date    Arthritis     multi-joint    Hypertension     IBS (irritable bowel syndrome)     Lipid disorder     Migraines     Osteopenia      Past Surgical History   Procedure Laterality Date    Arthrodesis MP joint left thumb Left 5/1/2024    Performed by Sandor Lopez MD at [facility]    Knee arthroscopy  2004    Oophorectomy Left 1984    and tubal ligation    Other surgical history      1990's - scar tissue excision in abdomen -    Tonsillectomy  1954     Social History     Socioeconomic History    Marital status:      Spouse name: None    Number of children: None    Years of education: None    Highest education level: None   Tobacco Use    Smoking status: Never    Smokeless tobacco: Never   Vaping Use    Vaping status: Never Used   Substance and Sexual Activity    Alcohol use: No    Drug use: No    Sexual activity: Defer     No family history on file.  Aspirin, Hydromorphone, Demerol [meperidine], Nsaids (non-steroidal anti-inflammatory drug), Penicillins, and Sulfa (sulfonamide antibiotics)  Current Outpatient Medications   Medication Sig Dispense Refill    cholecalciferol, vitamin D3,  "(VITAMIN D3 ORAL) Take by mouth daily.      ezetimibe (ZETIA) 10 mg tablet Take 1 tablet (10 mg total) by mouth nightly. 90 tablet 3    famotidine (PEPCID) 20 mg tablet Take 20 mg by mouth 2 (two) times a day.      telmisartan (MICARDIS) 40 mg tablet Take 1 tablet (40 mg total) by mouth daily. 90 tablet 3    topiramate 100 mg capsule,extended release 24hr Take 100 mg by mouth nightly.       No current facility-administered medications for this visit.       Review of Systems   Constitutional: Positive for weight loss. Negative for diaphoresis, malaise/fatigue and weight gain.   HENT:  Positive for hoarse voice. Negative for nosebleeds.    Eyes:  Negative for blurred vision.   Cardiovascular:  Positive for dyspnea on exertion. Negative for chest pain, claudication, leg swelling, orthopnea, palpitations and syncope.   Respiratory:  Positive for shortness of breath. Negative for cough.    Hematologic/Lymphatic: Negative for adenopathy.   Skin:  Negative for color change.   Musculoskeletal:  Positive for neck pain. Negative for muscle cramps.   Gastrointestinal:  Positive for diarrhea and heartburn.   Genitourinary:  Negative for nocturia.   Neurological:  Negative for light-headedness.   Psychiatric/Behavioral:  Negative for altered mental status. The patient has insomnia.      Objective   Vitals:    12/02/24 1254   BP: 110/68   BP Location: Left upper arm   Patient Position: Sitting   Pulse: 79   Resp: 16   SpO2: 98%   Weight: 42.2 kg (93 lb)   Height: 1.676 m (5' 6\")     Weight: 42.2 kg (93 lb)     Physical Exam:    General Appearance:  Alert, no distress   Head:  Normocephalic, without obvious abnormality, atraumatic   Eyes:  Conjunctiva/corneas clear, EOM's intact   Neck: No thyroid enlargement. No JVD. No bruits    Lungs:   Clear to auscultation bilaterally, respirations unlabored, no rales, no wheezing   Heart:  Regular rhythm, S1 and S2 normal, no murmur, rub or gallop   Abdomen:   Soft, non-tender, no masses, no " organomegaly   Vascular: Pulses 2+ and symmetric all extremities, no carotid bruit or jugular vein distention   Musculoskeletal:  Skin: No injury or deformity  Skin color, texture, turgor normal, no rashes or lesions, no cyanosis or edema   Extremities: Extremities normal, atraumatic, pulses normal   Behavior/Emotional: Appropriate, cooperative       Lab Results   Component Value Date    WBC 4.47 04/17/2024    HGB 12.9 04/17/2024     04/17/2024    CHOL 201 (H) 11/30/2017    TRIG 115 11/30/2017    HDL 61 11/30/2017    LDLCALC 117 (H) 11/30/2017    ALT 35 05/07/2019    AST 41 05/07/2019     04/17/2024    K 4.4 04/17/2024    CREATININE 0.9 04/17/2024    TSH 1.23 07/26/2016          ECG   sinus rhythm  Normal     ASSESSMENT/PLAN    Problem List Items Addressed This Visit          High    Essential hypertension - Primary    Current Assessment & Plan     Her blood pressure is acceptable on telmisartan.         Relevant Medications    telmisartan (MICARDIS) 40 mg tablet    Other Relevant Orders    [facility] MUSE ECG 12 lead (clinic performed) (Completed)       Medium    Dyslipidemia    Overview     No FH of MI, cigs, dm    2009 cath: neg CAD    2016 Calcium score: zero    2023: 10 year risk for ASCVD: 11%    Bad reaction to statin in the past         Current Assessment & Plan     She continues on Zetia.  I do not have any recent blood work available to me regarding her lipid profile.  She eats a healthy diet.         Presbyesophagus    Current Assessment & Plan     This is improving with therapy.         Relevant Medications    famotidine (PEPCID) 20 mg tablet       Low    Palpitations    Current Assessment & Plan     These have not been problematic recently.             Return in about 1 year (around 12/2/2025).    Sukh Sandra MD  12/2/2024

## 2024-12-02 NOTE — ASSESSMENT & PLAN NOTE
She continues on Zetia.  I do not have any recent blood work available to me regarding her lipid profile.  She eats a healthy diet.

## 2024-12-02 NOTE — LETTER
December 2, 2024     Venus Aparicio MD  [id number]  [id number]  [facility] [address]    Patient: Corrina Conteh  YOB: 1950  Date of Visit: 12/2/2024      Dear Dr. Aparicio:    Thank you for referring Corrina Conteh to me for evaluation. Below are my notes for this consultation.    If you have questions, please do not hesitate to call me. I look forward to following your patient along with you.         Sincerely,        Sukh Sandra MD        CC: No Recipients    Sukh Sandra MD  12/2/2024  5:31 PM  Sign when Signing Visit     Cardiology Note       Reason for visit:   Chief Complaint   Patient presents with   • Essential hypertension      HPI   Corrina Conteh is a 73 y.o. female who presents for scheduled follow-up.    Last saw her 1 year ago.  Unfortunately she tells me she has been very ill for the last several weeks or so.  She is a pain in her epigastrium trouble swallowing and has lost weight and has become more weak.  She is on a new medication which is now helping her which she is very pleased about.  Yesterday she was able to walk for 2.5 miles with her daughter.  She otherwise has been inactive.  She still has had some loose stools at times which is more of a chronic problem.  From a cardiac perspective, she denies chest pain, shortness of breath, palpitations, lightheadedness or syncope.      Past Medical History:   Diagnosis Date   • Arthritis     multi-joint   • Hypertension    • IBS (irritable bowel syndrome)    • Lipid disorder    • Migraines    • Osteopenia      Past Surgical History   Procedure Laterality Date   • Arthrodesis MP joint left thumb Left 5/1/2024    Performed by Sandor Lopez MD at [facility]   • Knee arthroscopy  2004   • Oophorectomy Left 1984    and tubal ligation   • Other surgical history      1990's - scar tissue excision in abdomen -   • Tonsillectomy  1954     Social History     Socioeconomic History   • Marital status:      Spouse  name: None   • Number of children: None   • Years of education: None   • Highest education level: None   Tobacco Use   • Smoking status: Never   • Smokeless tobacco: Never   Vaping Use   • Vaping status: Never Used   Substance and Sexual Activity   • Alcohol use: No   • Drug use: No   • Sexual activity: Defer     No family history on file.  Aspirin, Hydromorphone, Demerol [meperidine], Nsaids (non-steroidal anti-inflammatory drug), Penicillins, and Sulfa (sulfonamide antibiotics)  Current Outpatient Medications   Medication Sig Dispense Refill   • cholecalciferol, vitamin D3, (VITAMIN D3 ORAL) Take by mouth daily.     • ezetimibe (ZETIA) 10 mg tablet Take 1 tablet (10 mg total) by mouth nightly. 90 tablet 3   • famotidine (PEPCID) 20 mg tablet Take 20 mg by mouth 2 (two) times a day.     • telmisartan (MICARDIS) 40 mg tablet Take 1 tablet (40 mg total) by mouth daily. 90 tablet 3   • topiramate 100 mg capsule,extended release 24hr Take 100 mg by mouth nightly.       No current facility-administered medications for this visit.       Review of Systems   Constitutional: Positive for weight loss. Negative for diaphoresis, malaise/fatigue and weight gain.   HENT:  Positive for hoarse voice. Negative for nosebleeds.    Eyes:  Negative for blurred vision.   Cardiovascular:  Positive for dyspnea on exertion. Negative for chest pain, claudication, leg swelling, orthopnea, palpitations and syncope.   Respiratory:  Positive for shortness of breath. Negative for cough.    Hematologic/Lymphatic: Negative for adenopathy.   Skin:  Negative for color change.   Musculoskeletal:  Positive for neck pain. Negative for muscle cramps.   Gastrointestinal:  Positive for diarrhea and heartburn.   Genitourinary:  Negative for nocturia.   Neurological:  Negative for light-headedness.   Psychiatric/Behavioral:  Negative for altered mental status. The patient has insomnia.      Objective   Vitals:    12/02/24 1254   BP: 110/68   BP Location:  "Left upper arm   Patient Position: Sitting   Pulse: 79   Resp: 16   SpO2: 98%   Weight: 42.2 kg (93 lb)   Height: 1.676 m (5' 6\")     Weight: 42.2 kg (93 lb)     Physical Exam:    General Appearance:  Alert, no distress   Head:  Normocephalic, without obvious abnormality, atraumatic   Eyes:  Conjunctiva/corneas clear, EOM's intact   Neck: No thyroid enlargement. No JVD. No bruits    Lungs:   Clear to auscultation bilaterally, respirations unlabored, no rales, no wheezing   Heart:  Regular rhythm, S1 and S2 normal, no murmur, rub or gallop   Abdomen:   Soft, non-tender, no masses, no organomegaly   Vascular: Pulses 2+ and symmetric all extremities, no carotid bruit or jugular vein distention   Musculoskeletal:  Skin: No injury or deformity  Skin color, texture, turgor normal, no rashes or lesions, no cyanosis or edema   Extremities: Extremities normal, atraumatic, pulses normal   Behavior/Emotional: Appropriate, cooperative       Lab Results   Component Value Date    WBC 4.47 04/17/2024    HGB 12.9 04/17/2024     04/17/2024    CHOL 201 (H) 11/30/2017    TRIG 115 11/30/2017    HDL 61 11/30/2017    LDLCALC 117 (H) 11/30/2017    ALT 35 05/07/2019    AST 41 05/07/2019     04/17/2024    K 4.4 04/17/2024    CREATININE 0.9 04/17/2024    TSH 1.23 07/26/2016          ECG   sinus rhythm  Normal     ASSESSMENT/PLAN    Problem List Items Addressed This Visit          High    Essential hypertension - Primary    Current Assessment & Plan     Her blood pressure is acceptable on telmisartan.         Relevant Medications    telmisartan (MICARDIS) 40 mg tablet    Other Relevant Orders    [facility] MUSE ECG 12 lead (clinic performed) (Completed)       Medium    Dyslipidemia    Overview     No FH of MI, cigs, dm    2009 cath: neg CAD    2016 Calcium score: zero    2023: 10 year risk for ASCVD: 11%    Bad reaction to statin in the past         Current Assessment & Plan     She continues on Zetia.  I do not have any recent " blood work available to me regarding her lipid profile.  She eats a healthy diet.         Presbyesophagus    Current Assessment & Plan     This is improving with therapy.         Relevant Medications    famotidine (PEPCID) 20 mg tablet       Low    Palpitations    Current Assessment & Plan     These have not been problematic recently.             Return in about 1 year (around 12/2/2025).    Sukh Sandra MD  12/2/2024

## 2025-01-08 ENCOUNTER — TRANSCRIBE ORDERS (OUTPATIENT)
Dept: SCHEDULING | Age: 75
End: 2025-01-08

## 2025-01-08 DIAGNOSIS — M81.0 AGE-RELATED OSTEOPOROSIS WITHOUT CURRENT PATHOLOGICAL FRACTURE: Primary | ICD-10-CM

## 2025-02-19 ENCOUNTER — HOSPITAL ENCOUNTER (OUTPATIENT)
Dept: RADIOLOGY | Age: 75
Discharge: HOME | End: 2025-02-19
Attending: INTERNAL MEDICINE
Payer: COMMERCIAL

## 2025-02-19 DIAGNOSIS — M81.0 AGE-RELATED OSTEOPOROSIS WITHOUT CURRENT PATHOLOGICAL FRACTURE: ICD-10-CM

## 2025-02-19 PROCEDURE — 77080 DXA BONE DENSITY AXIAL: CPT

## 2025-03-04 ENCOUNTER — TRANSCRIBE ORDERS (OUTPATIENT)
Dept: RADIOLOGY | Facility: HOSPITAL | Age: 75
End: 2025-03-04

## 2025-03-04 ENCOUNTER — HOSPITAL ENCOUNTER (OUTPATIENT)
Dept: RADIOLOGY | Facility: HOSPITAL | Age: 75
Discharge: HOME | End: 2025-03-04
Attending: OBSTETRICS & GYNECOLOGY | Admitting: OBSTETRICS & GYNECOLOGY
Payer: COMMERCIAL

## 2025-03-04 DIAGNOSIS — Z12.31 ENCOUNTER FOR SCREENING MAMMOGRAM FOR MALIGNANT NEOPLASM OF BREAST: Primary | ICD-10-CM

## 2025-03-04 DIAGNOSIS — Z12.31 ENCOUNTER FOR SCREENING MAMMOGRAM FOR MALIGNANT NEOPLASM OF BREAST: ICD-10-CM

## 2025-03-04 PROCEDURE — 77067 SCR MAMMO BI INCL CAD: CPT

## 2025-07-26 DIAGNOSIS — Z00.6 ENCOUNTER FOR EXAMINATION FOR NORMAL COMPARISON OR CONTROL IN CLINICAL RESEARCH PROGRAM: ICD-10-CM

## (undated) DEVICE — COVER LIGHTHANDLE (STERILE SINGLE PA

## (undated) DEVICE — COMPOUND BENZOIN TINCTURE

## (undated) DEVICE — TOWEL SURGICAL W17XL27IN BLUE COTTON STANDARD PREWASHED DELI

## (undated) DEVICE — GLOVE SZ 7 PROTEXIS CLASSIC LATEX

## (undated) DEVICE — SOLN IRRIG .9%SOD 1000ML

## (undated) DEVICE — Device

## (undated) DEVICE — NEEDLE 18GX1.5IN SHORT

## (undated) DEVICE — DRAPE MINI C-ARM FOR OEC MODEL 6600

## (undated) DEVICE — GOWN SIRUS FABRNF RAGLAN XL ST 28/CS

## (undated) DEVICE — CUFF TOURNIQUET DISP 18 X 4

## (undated) DEVICE — ADHESIVE SKIN DERMABOND ADVANCED 0.7ML

## (undated) DEVICE — PACK RFID HAND AND FOOT

## (undated) DEVICE — RETRACTOR ARMY NAVY 8 1/2 IN

## (undated) DEVICE — BLADE MINI BEAVER #6400

## (undated) DEVICE — CASTING ROLL PLASTER 4IN EXTRA

## (undated) DEVICE — VESSEL LOOP MAXI RED

## (undated) DEVICE — C-WIRE .045 00505004300

## (undated) DEVICE — SUTURE SOFSILK 3-0 BLACK 1X18 C-23

## (undated) DEVICE — PASSER SWANSON SUTURE

## (undated) DEVICE — DRESSING ADAPTIC STERILE 3X3

## (undated) DEVICE — BANDAGE CONFORM 3IN STERILE

## (undated) DEVICE — APPLICATOR CHLORAPREP 26ML ORANGE TINT

## (undated) DEVICE — SUTURE MAXON 4-0 UNDYED 1X18 P-12

## (undated) DEVICE — IMPLANTABLE DEVICE
Type: IMPLANTABLE DEVICE | Status: NON-FUNCTIONAL
Removed: 2024-05-01

## (undated) DEVICE — C-WIRE .062 00505004400

## (undated) DEVICE — CORD BIPOLAR CODMAN DISPOSABLE 10-CS

## (undated) DEVICE — BLADE BEAVER #6700

## (undated) DEVICE — SPONGE KERLIX MEDIUM ROLL#6725